# Patient Record
Sex: MALE | Race: OTHER | HISPANIC OR LATINO | URBAN - METROPOLITAN AREA
[De-identification: names, ages, dates, MRNs, and addresses within clinical notes are randomized per-mention and may not be internally consistent; named-entity substitution may affect disease eponyms.]

---

## 2023-05-16 ENCOUNTER — INPATIENT (INPATIENT)
Facility: HOSPITAL | Age: 53
LOS: 3 days | Discharge: HOME | DRG: 419 | End: 2023-05-20
Attending: SURGERY | Admitting: SURGERY
Payer: MEDICAID

## 2023-05-16 VITALS
RESPIRATION RATE: 19 BRPM | DIASTOLIC BLOOD PRESSURE: 69 MMHG | WEIGHT: 229.94 LBS | OXYGEN SATURATION: 97 % | SYSTOLIC BLOOD PRESSURE: 109 MMHG | TEMPERATURE: 98 F | HEART RATE: 100 BPM | HEIGHT: 75 IN

## 2023-05-16 DIAGNOSIS — K80.20 CALCULUS OF GALLBLADDER WITHOUT CHOLECYSTITIS WITHOUT OBSTRUCTION: ICD-10-CM

## 2023-05-16 LAB
ALBUMIN SERPL ELPH-MCNC: 3.5 G/DL — SIGNIFICANT CHANGE UP (ref 3.5–5)
ALP SERPL-CCNC: 78 U/L — SIGNIFICANT CHANGE UP (ref 40–120)
ALT FLD-CCNC: 34 U/L DA — SIGNIFICANT CHANGE UP (ref 10–60)
ANION GAP SERPL CALC-SCNC: 3 MMOL/L — LOW (ref 5–17)
ANION GAP SERPL CALC-SCNC: 3 MMOL/L — LOW (ref 5–17)
AST SERPL-CCNC: 19 U/L — SIGNIFICANT CHANGE UP (ref 10–40)
BASOPHILS # BLD AUTO: 0.05 K/UL — SIGNIFICANT CHANGE UP (ref 0–0.2)
BASOPHILS NFR BLD AUTO: 0.8 % — SIGNIFICANT CHANGE UP (ref 0–2)
BILIRUB SERPL-MCNC: 0.3 MG/DL — SIGNIFICANT CHANGE UP (ref 0.2–1.2)
BLD GP AB SCN SERPL QL: SIGNIFICANT CHANGE UP
BUN SERPL-MCNC: 13 MG/DL — SIGNIFICANT CHANGE UP (ref 7–18)
BUN SERPL-MCNC: 14 MG/DL — SIGNIFICANT CHANGE UP (ref 7–18)
CALCIUM SERPL-MCNC: 8.9 MG/DL — SIGNIFICANT CHANGE UP (ref 8.4–10.5)
CALCIUM SERPL-MCNC: 9.9 MG/DL — SIGNIFICANT CHANGE UP (ref 8.4–10.5)
CHLORIDE SERPL-SCNC: 111 MMOL/L — HIGH (ref 96–108)
CHLORIDE SERPL-SCNC: 111 MMOL/L — HIGH (ref 96–108)
CO2 SERPL-SCNC: 24 MMOL/L — SIGNIFICANT CHANGE UP (ref 22–31)
CO2 SERPL-SCNC: 25 MMOL/L — SIGNIFICANT CHANGE UP (ref 22–31)
CREAT SERPL-MCNC: 1.03 MG/DL — SIGNIFICANT CHANGE UP (ref 0.5–1.3)
CREAT SERPL-MCNC: 1.11 MG/DL — SIGNIFICANT CHANGE UP (ref 0.5–1.3)
EGFR: 80 ML/MIN/1.73M2 — SIGNIFICANT CHANGE UP
EGFR: 87 ML/MIN/1.73M2 — SIGNIFICANT CHANGE UP
EOSINOPHIL # BLD AUTO: 0.11 K/UL — SIGNIFICANT CHANGE UP (ref 0–0.5)
EOSINOPHIL NFR BLD AUTO: 1.7 % — SIGNIFICANT CHANGE UP (ref 0–6)
GLUCOSE SERPL-MCNC: 124 MG/DL — HIGH (ref 70–99)
GLUCOSE SERPL-MCNC: 175 MG/DL — HIGH (ref 70–99)
GLUCOSE SERPL-MCNC: 180 MG/DL — HIGH (ref 70–99)
HCT VFR BLD CALC: 40 % — SIGNIFICANT CHANGE UP (ref 39–50)
HGB BLD-MCNC: 14.1 G/DL — SIGNIFICANT CHANGE UP (ref 13–17)
IMM GRANULOCYTES NFR BLD AUTO: 0.3 % — SIGNIFICANT CHANGE UP (ref 0–0.9)
LIDOCAIN IGE QN: 237 U/L — SIGNIFICANT CHANGE UP (ref 73–393)
LYMPHOCYTES # BLD AUTO: 2.02 K/UL — SIGNIFICANT CHANGE UP (ref 1–3.3)
LYMPHOCYTES # BLD AUTO: 32 % — SIGNIFICANT CHANGE UP (ref 13–44)
MAGNESIUM SERPL-MCNC: 2.3 MG/DL — SIGNIFICANT CHANGE UP (ref 1.6–2.6)
MCHC RBC-ENTMCNC: 30.4 PG — SIGNIFICANT CHANGE UP (ref 27–34)
MCHC RBC-ENTMCNC: 35.3 GM/DL — SIGNIFICANT CHANGE UP (ref 32–36)
MCV RBC AUTO: 86.2 FL — SIGNIFICANT CHANGE UP (ref 80–100)
MONOCYTES # BLD AUTO: 0.53 K/UL — SIGNIFICANT CHANGE UP (ref 0–0.9)
MONOCYTES NFR BLD AUTO: 8.4 % — SIGNIFICANT CHANGE UP (ref 2–14)
NEUTROPHILS # BLD AUTO: 3.59 K/UL — SIGNIFICANT CHANGE UP (ref 1.8–7.4)
NEUTROPHILS NFR BLD AUTO: 56.8 % — SIGNIFICANT CHANGE UP (ref 43–77)
NRBC # BLD: 0 /100 WBCS — SIGNIFICANT CHANGE UP (ref 0–0)
PHOSPHATE SERPL-MCNC: 3.4 MG/DL — SIGNIFICANT CHANGE UP (ref 2.5–4.5)
PLATELET # BLD AUTO: 264 K/UL — SIGNIFICANT CHANGE UP (ref 150–400)
POTASSIUM SERPL-MCNC: 3.9 MMOL/L — SIGNIFICANT CHANGE UP (ref 3.5–5.3)
POTASSIUM SERPL-MCNC: 3.9 MMOL/L — SIGNIFICANT CHANGE UP (ref 3.5–5.3)
POTASSIUM SERPL-SCNC: 3.9 MMOL/L — SIGNIFICANT CHANGE UP (ref 3.5–5.3)
POTASSIUM SERPL-SCNC: 3.9 MMOL/L — SIGNIFICANT CHANGE UP (ref 3.5–5.3)
PROT SERPL-MCNC: 7.8 G/DL — SIGNIFICANT CHANGE UP (ref 6–8.3)
RBC # BLD: 4.64 M/UL — SIGNIFICANT CHANGE UP (ref 4.2–5.8)
RBC # FLD: 12 % — SIGNIFICANT CHANGE UP (ref 10.3–14.5)
SODIUM SERPL-SCNC: 138 MMOL/L — SIGNIFICANT CHANGE UP (ref 135–145)
SODIUM SERPL-SCNC: 139 MMOL/L — SIGNIFICANT CHANGE UP (ref 135–145)
TROPONIN I, HIGH SENSITIVITY RESULT: 6.4 NG/L — SIGNIFICANT CHANGE UP
WBC # BLD: 6.32 K/UL — SIGNIFICANT CHANGE UP (ref 3.8–10.5)
WBC # FLD AUTO: 6.32 K/UL — SIGNIFICANT CHANGE UP (ref 3.8–10.5)

## 2023-05-16 PROCEDURE — 99285 EMERGENCY DEPT VISIT HI MDM: CPT

## 2023-05-16 PROCEDURE — 74177 CT ABD & PELVIS W/CONTRAST: CPT | Mod: 26,MA

## 2023-05-16 PROCEDURE — 99222 1ST HOSP IP/OBS MODERATE 55: CPT

## 2023-05-16 PROCEDURE — 76705 ECHO EXAM OF ABDOMEN: CPT | Mod: 26

## 2023-05-16 RX ORDER — SODIUM CHLORIDE 9 MG/ML
1000 INJECTION, SOLUTION INTRAVENOUS
Refills: 0 | Status: DISCONTINUED | OUTPATIENT
Start: 2023-05-16 | End: 2023-05-20

## 2023-05-16 RX ORDER — DEXTROSE 50 % IN WATER 50 %
25 SYRINGE (ML) INTRAVENOUS ONCE
Refills: 0 | Status: DISCONTINUED | OUTPATIENT
Start: 2023-05-16 | End: 2023-05-20

## 2023-05-16 RX ORDER — ONDANSETRON 8 MG/1
4 TABLET, FILM COATED ORAL EVERY 6 HOURS
Refills: 0 | Status: DISCONTINUED | OUTPATIENT
Start: 2023-05-16 | End: 2023-05-20

## 2023-05-16 RX ORDER — ENOXAPARIN SODIUM 100 MG/ML
40 INJECTION SUBCUTANEOUS EVERY 24 HOURS
Refills: 0 | Status: DISCONTINUED | OUTPATIENT
Start: 2023-05-16 | End: 2023-05-19

## 2023-05-16 RX ORDER — METRONIDAZOLE 500 MG
500 TABLET ORAL ONCE
Refills: 0 | Status: COMPLETED | OUTPATIENT
Start: 2023-05-16 | End: 2023-05-16

## 2023-05-16 RX ORDER — GLUCAGON INJECTION, SOLUTION 0.5 MG/.1ML
1 INJECTION, SOLUTION SUBCUTANEOUS ONCE
Refills: 0 | Status: DISCONTINUED | OUTPATIENT
Start: 2023-05-16 | End: 2023-05-20

## 2023-05-16 RX ORDER — DEXTROSE 50 % IN WATER 50 %
12.5 SYRINGE (ML) INTRAVENOUS ONCE
Refills: 0 | Status: DISCONTINUED | OUTPATIENT
Start: 2023-05-16 | End: 2023-05-20

## 2023-05-16 RX ORDER — METRONIDAZOLE 500 MG
TABLET ORAL
Refills: 0 | Status: DISCONTINUED | OUTPATIENT
Start: 2023-05-16 | End: 2023-05-19

## 2023-05-16 RX ORDER — HYDROMORPHONE HYDROCHLORIDE 2 MG/ML
0.5 INJECTION INTRAMUSCULAR; INTRAVENOUS; SUBCUTANEOUS EVERY 4 HOURS
Refills: 0 | Status: DISCONTINUED | OUTPATIENT
Start: 2023-05-16 | End: 2023-05-20

## 2023-05-16 RX ORDER — SODIUM CHLORIDE 9 MG/ML
1000 INJECTION INTRAMUSCULAR; INTRAVENOUS; SUBCUTANEOUS ONCE
Refills: 0 | Status: COMPLETED | OUTPATIENT
Start: 2023-05-16 | End: 2023-05-16

## 2023-05-16 RX ORDER — MORPHINE SULFATE 50 MG/1
4 CAPSULE, EXTENDED RELEASE ORAL ONCE
Refills: 0 | Status: DISCONTINUED | OUTPATIENT
Start: 2023-05-16 | End: 2023-05-16

## 2023-05-16 RX ORDER — DEXTROSE 50 % IN WATER 50 %
15 SYRINGE (ML) INTRAVENOUS ONCE
Refills: 0 | Status: DISCONTINUED | OUTPATIENT
Start: 2023-05-16 | End: 2023-05-20

## 2023-05-16 RX ORDER — INSULIN LISPRO 100/ML
VIAL (ML) SUBCUTANEOUS EVERY 6 HOURS
Refills: 0 | Status: DISCONTINUED | OUTPATIENT
Start: 2023-05-16 | End: 2023-05-20

## 2023-05-16 RX ORDER — METRONIDAZOLE 500 MG
500 TABLET ORAL EVERY 8 HOURS
Refills: 0 | Status: DISCONTINUED | OUTPATIENT
Start: 2023-05-17 | End: 2023-05-19

## 2023-05-16 RX ADMIN — HYDROMORPHONE HYDROCHLORIDE 0.5 MILLIGRAM(S): 2 INJECTION INTRAMUSCULAR; INTRAVENOUS; SUBCUTANEOUS at 23:10

## 2023-05-16 RX ADMIN — Medication 100 MILLIGRAM(S): at 23:36

## 2023-05-16 RX ADMIN — SODIUM CHLORIDE 1000 MILLILITER(S): 9 INJECTION INTRAMUSCULAR; INTRAVENOUS; SUBCUTANEOUS at 17:00

## 2023-05-16 RX ADMIN — MORPHINE SULFATE 4 MILLIGRAM(S): 50 CAPSULE, EXTENDED RELEASE ORAL at 19:57

## 2023-05-16 RX ADMIN — MORPHINE SULFATE 4 MILLIGRAM(S): 50 CAPSULE, EXTENDED RELEASE ORAL at 17:45

## 2023-05-16 RX ADMIN — MORPHINE SULFATE 4 MILLIGRAM(S): 50 CAPSULE, EXTENDED RELEASE ORAL at 17:12

## 2023-05-16 RX ADMIN — SODIUM CHLORIDE 140 MILLILITER(S): 9 INJECTION, SOLUTION INTRAVENOUS at 23:36

## 2023-05-16 RX ADMIN — MORPHINE SULFATE 4 MILLIGRAM(S): 50 CAPSULE, EXTENDED RELEASE ORAL at 20:17

## 2023-05-16 RX ADMIN — ONDANSETRON 4 MILLIGRAM(S): 8 TABLET, FILM COATED ORAL at 22:21

## 2023-05-16 RX ADMIN — HYDROMORPHONE HYDROCHLORIDE 0.5 MILLIGRAM(S): 2 INJECTION INTRAMUSCULAR; INTRAVENOUS; SUBCUTANEOUS at 22:13

## 2023-05-16 RX ADMIN — ENOXAPARIN SODIUM 40 MILLIGRAM(S): 100 INJECTION SUBCUTANEOUS at 23:43

## 2023-05-16 RX ADMIN — Medication 30 MILLILITER(S): at 23:11

## 2023-05-16 NOTE — H&P ADULT - NSHPPHYSICALEXAM_GEN_ALL_CORE
Vital Signs Last 24 Hrs  T(C): 36.7 (16 May 2023 16:20), Max: 36.7 (16 May 2023 16:20)  T(F): 98 (16 May 2023 16:20), Max: 98 (16 May 2023 16:20)  HR: 100 (16 May 2023 16:20) (100 - 100)  BP: 109/69 (16 May 2023 16:20) (109/69 - 109/69)  BP(mean): --  RR: 19 (16 May 2023 16:20) (19 - 19)  SpO2: 97% (16 May 2023 16:20) (97% - 97%)    Parameters below as of 16 May 2023 16:20  Patient On (Oxygen Delivery Method): room air        General:  A&Ox3,Appears stated age, No acute distress,  Head: NC/AT  EENT: PERRLA. EOMI. Conjunctiva and sclera clear. Pharynx clear.  Neck: Supple. No JVD  Lungs: CTA B/l. Nonlabored Respirations  CV: +S1S2, RRR  Abdomen: Soft, Nondistended, ++RUQ tenderness, no guarding, no rebound  Extremities: Warm and well perfused. 2+ peripheral pulses b/l. Calf soft, nontender b/l. No pedal edema.

## 2023-05-16 NOTE — ED ADULT NURSE NOTE - OBJECTIVE STATEMENT
pt is here for abdominal pain.  pt stated that RUQ pain x one week, denied N/V/D or fever, a/ox3, ambulatory,

## 2023-05-16 NOTE — ED PROVIDER NOTE - CLINICAL SUMMARY MEDICAL DECISION MAKING FREE TEXT BOX
52-year-old male hx of HTN, DM, presenting with RUQ abdominal pain for the past week, worse today. Differential includes cholecystitis vs gastritis vs pancreatitis vs colitis vs other intrabdominal pathology - will check labs, urine, CTAP, RUQ US, provide analgesia and reassess.

## 2023-05-16 NOTE — ED ADULT TRIAGE NOTE - CCCP TRG CHIEF CMPLNT
abdominal pain Pounds Preamble Statement (Weight Entered In Details Tab): Reported Weight in pounds: 115

## 2023-05-16 NOTE — H&P ADULT - HISTORY OF PRESENT ILLNESS
51 y/o male with PMH of HTN, DM, Gastritis, Depression disorder presents with Abd Pain  x 1 wk . Pain is in the RUQ , very sharp, persistent  and disabling.  Not worse with food, no clear exacerbating factors. No nausea, vomiting, fevers, chills. No hx of abdominal surgeries. No other symptoms.

## 2023-05-16 NOTE — H&P ADULT - NSHPLABSRESULTS_GEN_ALL_CORE
14.1   6.32  )-----------( 264      ( 16 May 2023 17:00 )             40.0   05-16    138  |  111<H>  |  x   ----------------------------<  x   3.9   |  x   |  x     Ca    9.9      16 May 2023 17:00    TPro  7.8  /  Alb  3.5  /  TBili  0.3  /  DBili  x   /  AST  19  /  ALT  34  /  AlkPhos  78  05-16      < from: US Abdomen Upper Quadrant Right (05.16.23 @ 17:58) >    FINDINGS:  Liver: Hepatomegaly. Increased liver echogenicity suggestive of fatty   infiltration  Bile ducts: Normal caliber. Common bile duct measures 6 mm.  Gallbladder: Cholelithiasis with positive sonographic Blake sign. No   evidence of wall thickening or pericholecystic fluid.  Pancreas: Visualized portions are within normal limits.  Right kidney: 13.1 cm. No hydronephrosis.  Ascites: None.  IVC: Visualized portions are within normal limits.    IMPRESSION:  Fatty enlarged liver. Cholelithiasis without evidence of acute   cholecystitis    < end of copied text >

## 2023-05-16 NOTE — ED PROVIDER NOTE - OBJECTIVE STATEMENT
52-year-old male hx of HTN, DM, presenting with RUQ abdominal pain for the past week, worse today. Constant. Not worse with food, no clear exacerbating factors. No nausea, vomiting, fevers, chills. No hx of abdominal surgeries. No other symptoms.

## 2023-05-16 NOTE — H&P ADULT - ASSESSMENT
51 y/o male with PMH of HTN, DM, Gastritis, Depression disorder presents with Abd Pain  x 1 wk . Pain is in the RUQ , very sharp, persistent  and disabling.  Not worse with food, no clear exacerbating factors. Afebrile, No leucocytosis , No LFTs US shows cholelithiasis. Admitted for symptomatic cholelithiasis    Admit to Surgery under Dr Naik   OR-this admission for lap possible open cholecystectomy  NPO  IVF  IV ABx  f/up preop labs   DVT PPx

## 2023-05-17 DIAGNOSIS — I10 ESSENTIAL (PRIMARY) HYPERTENSION: ICD-10-CM

## 2023-05-17 DIAGNOSIS — Z01.818 ENCOUNTER FOR OTHER PREPROCEDURAL EXAMINATION: ICD-10-CM

## 2023-05-17 DIAGNOSIS — I25.10 ATHEROSCLEROTIC HEART DISEASE OF NATIVE CORONARY ARTERY WITHOUT ANGINA PECTORIS: ICD-10-CM

## 2023-05-17 LAB
A1C WITH ESTIMATED AVERAGE GLUCOSE RESULT: 10.8 % — HIGH (ref 4–5.6)
ALBUMIN SERPL ELPH-MCNC: 2.8 G/DL — LOW (ref 3.5–5)
ALP SERPL-CCNC: 53 U/L — SIGNIFICANT CHANGE UP (ref 40–120)
ALT FLD-CCNC: 29 U/L DA — SIGNIFICANT CHANGE UP (ref 10–60)
ANION GAP SERPL CALC-SCNC: 5 MMOL/L — SIGNIFICANT CHANGE UP (ref 5–17)
APTT BLD: 21.1 SEC — LOW (ref 27.5–35.5)
APTT BLD: 33.1 SEC — SIGNIFICANT CHANGE UP (ref 27.5–35.5)
AST SERPL-CCNC: 15 U/L — SIGNIFICANT CHANGE UP (ref 10–40)
BASOPHILS # BLD AUTO: 0.04 K/UL — SIGNIFICANT CHANGE UP (ref 0–0.2)
BASOPHILS NFR BLD AUTO: 0.6 % — SIGNIFICANT CHANGE UP (ref 0–2)
BILIRUB SERPL-MCNC: 0.4 MG/DL — SIGNIFICANT CHANGE UP (ref 0.2–1.2)
BUN SERPL-MCNC: 11 MG/DL — SIGNIFICANT CHANGE UP (ref 7–18)
CALCIUM SERPL-MCNC: 8.4 MG/DL — SIGNIFICANT CHANGE UP (ref 8.4–10.5)
CHLORIDE SERPL-SCNC: 106 MMOL/L — SIGNIFICANT CHANGE UP (ref 96–108)
CO2 SERPL-SCNC: 26 MMOL/L — SIGNIFICANT CHANGE UP (ref 22–31)
CREAT SERPL-MCNC: 1.06 MG/DL — SIGNIFICANT CHANGE UP (ref 0.5–1.3)
EGFR: 84 ML/MIN/1.73M2 — SIGNIFICANT CHANGE UP
EOSINOPHIL # BLD AUTO: 0.05 K/UL — SIGNIFICANT CHANGE UP (ref 0–0.5)
EOSINOPHIL NFR BLD AUTO: 0.7 % — SIGNIFICANT CHANGE UP (ref 0–6)
ESTIMATED AVERAGE GLUCOSE: 263 MG/DL — HIGH (ref 68–114)
GLUCOSE BLDC GLUCOMTR-MCNC: 167 MG/DL — HIGH (ref 70–99)
GLUCOSE BLDC GLUCOMTR-MCNC: 188 MG/DL — HIGH (ref 70–99)
GLUCOSE BLDC GLUCOMTR-MCNC: 203 MG/DL — HIGH (ref 70–99)
GLUCOSE BLDC GLUCOMTR-MCNC: 235 MG/DL — HIGH (ref 70–99)
GLUCOSE BLDC GLUCOMTR-MCNC: 241 MG/DL — HIGH (ref 70–99)
GLUCOSE BLDC GLUCOMTR-MCNC: 261 MG/DL — HIGH (ref 70–99)
GLUCOSE BLDC GLUCOMTR-MCNC: 267 MG/DL — HIGH (ref 70–99)
GLUCOSE BLDC GLUCOMTR-MCNC: 272 MG/DL — HIGH (ref 70–99)
GLUCOSE SERPL-MCNC: 286 MG/DL — HIGH (ref 70–99)
HCT VFR BLD CALC: 36 % — LOW (ref 39–50)
HGB BLD-MCNC: 12.4 G/DL — LOW (ref 13–17)
IMM GRANULOCYTES NFR BLD AUTO: 0.4 % — SIGNIFICANT CHANGE UP (ref 0–0.9)
INR BLD: 1.07 RATIO — SIGNIFICANT CHANGE UP (ref 0.88–1.16)
INR BLD: 2.05 RATIO — HIGH (ref 0.88–1.16)
LYMPHOCYTES # BLD AUTO: 1.74 K/UL — SIGNIFICANT CHANGE UP (ref 1–3.3)
LYMPHOCYTES # BLD AUTO: 24.6 % — SIGNIFICANT CHANGE UP (ref 13–44)
MCHC RBC-ENTMCNC: 30.2 PG — SIGNIFICANT CHANGE UP (ref 27–34)
MCHC RBC-ENTMCNC: 34.4 GM/DL — SIGNIFICANT CHANGE UP (ref 32–36)
MCV RBC AUTO: 87.8 FL — SIGNIFICANT CHANGE UP (ref 80–100)
MONOCYTES # BLD AUTO: 0.5 K/UL — SIGNIFICANT CHANGE UP (ref 0–0.9)
MONOCYTES NFR BLD AUTO: 7.1 % — SIGNIFICANT CHANGE UP (ref 2–14)
NEUTROPHILS # BLD AUTO: 4.72 K/UL — SIGNIFICANT CHANGE UP (ref 1.8–7.4)
NEUTROPHILS NFR BLD AUTO: 66.6 % — SIGNIFICANT CHANGE UP (ref 43–77)
NRBC # BLD: 0 /100 WBCS — SIGNIFICANT CHANGE UP (ref 0–0)
PLATELET # BLD AUTO: 213 K/UL — SIGNIFICANT CHANGE UP (ref 150–400)
POTASSIUM SERPL-MCNC: 4 MMOL/L — SIGNIFICANT CHANGE UP (ref 3.5–5.3)
POTASSIUM SERPL-SCNC: 4 MMOL/L — SIGNIFICANT CHANGE UP (ref 3.5–5.3)
PROT SERPL-MCNC: 6.6 G/DL — SIGNIFICANT CHANGE UP (ref 6–8.3)
PROTHROM AB SERPL-ACNC: 12.8 SEC — SIGNIFICANT CHANGE UP (ref 10.5–13.4)
PROTHROM AB SERPL-ACNC: 24.6 SEC — HIGH (ref 10.5–13.4)
RBC # BLD: 4.1 M/UL — LOW (ref 4.2–5.8)
RBC # FLD: 12.2 % — SIGNIFICANT CHANGE UP (ref 10.3–14.5)
SODIUM SERPL-SCNC: 137 MMOL/L — SIGNIFICANT CHANGE UP (ref 135–145)
WBC # BLD: 7.08 K/UL — SIGNIFICANT CHANGE UP (ref 3.8–10.5)
WBC # FLD AUTO: 7.08 K/UL — SIGNIFICANT CHANGE UP (ref 3.8–10.5)

## 2023-05-17 PROCEDURE — 99232 SBSQ HOSP IP/OBS MODERATE 35: CPT

## 2023-05-17 PROCEDURE — 99222 1ST HOSP IP/OBS MODERATE 55: CPT

## 2023-05-17 PROCEDURE — 99223 1ST HOSP IP/OBS HIGH 75: CPT

## 2023-05-17 RX ORDER — DULOXETINE HYDROCHLORIDE 30 MG/1
60 CAPSULE, DELAYED RELEASE ORAL DAILY
Refills: 0 | Status: DISCONTINUED | OUTPATIENT
Start: 2023-05-17 | End: 2023-05-20

## 2023-05-17 RX ORDER — LISINOPRIL 2.5 MG/1
2.5 TABLET ORAL DAILY
Refills: 0 | Status: DISCONTINUED | OUTPATIENT
Start: 2023-05-17 | End: 2023-05-20

## 2023-05-17 RX ORDER — INSULIN GLARGINE 100 [IU]/ML
15 INJECTION, SOLUTION SUBCUTANEOUS AT BEDTIME
Refills: 0 | Status: DISCONTINUED | OUTPATIENT
Start: 2023-05-17 | End: 2023-05-20

## 2023-05-17 RX ORDER — PHYTONADIONE (VIT K1) 5 MG
10 TABLET ORAL ONCE
Refills: 0 | Status: COMPLETED | OUTPATIENT
Start: 2023-05-17 | End: 2023-05-17

## 2023-05-17 RX ORDER — LISINOPRIL 2.5 MG/1
1 TABLET ORAL
Refills: 0 | DISCHARGE

## 2023-05-17 RX ORDER — DULOXETINE HYDROCHLORIDE 30 MG/1
1 CAPSULE, DELAYED RELEASE ORAL
Refills: 0 | DISCHARGE

## 2023-05-17 RX ORDER — ACETAMINOPHEN 500 MG
1000 TABLET ORAL ONCE
Refills: 0 | Status: COMPLETED | OUTPATIENT
Start: 2023-05-17 | End: 2023-05-17

## 2023-05-17 RX ORDER — FAMOTIDINE 10 MG/ML
1 INJECTION INTRAVENOUS
Refills: 0 | DISCHARGE

## 2023-05-17 RX ORDER — ASPIRIN/CALCIUM CARB/MAGNESIUM 324 MG
1 TABLET ORAL
Refills: 0 | DISCHARGE

## 2023-05-17 RX ORDER — ASPIRIN/CALCIUM CARB/MAGNESIUM 324 MG
81 TABLET ORAL DAILY
Refills: 0 | Status: DISCONTINUED | OUTPATIENT
Start: 2023-05-17 | End: 2023-05-20

## 2023-05-17 RX ORDER — INSULIN GLARGINE 100 [IU]/ML
35 INJECTION, SOLUTION SUBCUTANEOUS
Refills: 0 | DISCHARGE

## 2023-05-17 RX ORDER — INSULIN ASPART 100 [IU]/ML
25 INJECTION, SOLUTION SUBCUTANEOUS
Refills: 0 | DISCHARGE

## 2023-05-17 RX ORDER — FAMOTIDINE 10 MG/ML
40 INJECTION INTRAVENOUS DAILY
Refills: 0 | Status: DISCONTINUED | OUTPATIENT
Start: 2023-05-17 | End: 2023-05-20

## 2023-05-17 RX ORDER — KETOROLAC TROMETHAMINE 30 MG/ML
15 SYRINGE (ML) INJECTION EVERY 6 HOURS
Refills: 0 | Status: DISCONTINUED | OUTPATIENT
Start: 2023-05-17 | End: 2023-05-20

## 2023-05-17 RX ADMIN — Medication 100 MILLIGRAM(S): at 21:44

## 2023-05-17 RX ADMIN — ENOXAPARIN SODIUM 40 MILLIGRAM(S): 100 INJECTION SUBCUTANEOUS at 22:56

## 2023-05-17 RX ADMIN — ONDANSETRON 4 MILLIGRAM(S): 8 TABLET, FILM COATED ORAL at 09:43

## 2023-05-17 RX ADMIN — Medication 6: at 12:40

## 2023-05-17 RX ADMIN — Medication 4: at 01:31

## 2023-05-17 RX ADMIN — Medication 100 MILLIGRAM(S): at 13:35

## 2023-05-17 RX ADMIN — Medication 400 MILLIGRAM(S): at 13:18

## 2023-05-17 RX ADMIN — Medication 100 MILLIGRAM(S): at 05:30

## 2023-05-17 RX ADMIN — SODIUM CHLORIDE 140 MILLILITER(S): 9 INJECTION, SOLUTION INTRAVENOUS at 22:56

## 2023-05-17 RX ADMIN — SODIUM CHLORIDE 140 MILLILITER(S): 9 INJECTION, SOLUTION INTRAVENOUS at 11:43

## 2023-05-17 RX ADMIN — Medication 15 MILLIGRAM(S): at 18:40

## 2023-05-17 RX ADMIN — INSULIN GLARGINE 15 UNIT(S): 100 INJECTION, SOLUTION SUBCUTANEOUS at 22:06

## 2023-05-17 RX ADMIN — ONDANSETRON 4 MILLIGRAM(S): 8 TABLET, FILM COATED ORAL at 18:12

## 2023-05-17 RX ADMIN — Medication 4: at 23:53

## 2023-05-17 RX ADMIN — Medication 1000 MILLIGRAM(S): at 00:00

## 2023-05-17 RX ADMIN — Medication 102 MILLIGRAM(S): at 09:06

## 2023-05-17 RX ADMIN — Medication 6: at 06:10

## 2023-05-17 RX ADMIN — Medication 2: at 18:13

## 2023-05-17 RX ADMIN — HYDROMORPHONE HYDROCHLORIDE 0.5 MILLIGRAM(S): 2 INJECTION INTRAMUSCULAR; INTRAVENOUS; SUBCUTANEOUS at 10:05

## 2023-05-17 RX ADMIN — HYDROMORPHONE HYDROCHLORIDE 0.5 MILLIGRAM(S): 2 INJECTION INTRAMUSCULAR; INTRAVENOUS; SUBCUTANEOUS at 09:38

## 2023-05-17 RX ADMIN — DULOXETINE HYDROCHLORIDE 60 MILLIGRAM(S): 30 CAPSULE, DELAYED RELEASE ORAL at 11:40

## 2023-05-17 RX ADMIN — Medication 15 MILLIGRAM(S): at 18:12

## 2023-05-17 RX ADMIN — Medication 81 MILLIGRAM(S): at 11:41

## 2023-05-17 NOTE — CONSULT NOTE ADULT - PROBLEM SELECTOR RECOMMENDATION 9
-patient currently with no evidence of acute MI, arrythmia, CHF, severe aortic stenosis  -RCRI class III risk which is 10% -30 day risk of death, MI or cardiac arrest  -obtain echo for structural abnormalities  -if echo unremarkable, no further testing needed prior to surgery

## 2023-05-17 NOTE — CONSULT NOTE ADULT - ASSESSMENT
53 y/o male with PMH of HTN, DM, Gastritis, Depression disorder, PAD, CAD on aspirin, Ruptured brain aneurysm with surgical decompression 10 years ago presents with abdominal pain x 1 week.  CT revealed gallbladder stones. Patient getting ready for lap cholecystectomy.  Upon eval, patient reports having SOB after he goes up 2 flights of stairs.   Patient denies chest pain, palpitations, syncope, LE edema, PND/orthopnea. Cardiology was consulted for pre op clearance

## 2023-05-17 NOTE — CONSULT NOTE ADULT - PROBLEM SELECTOR RECOMMENDATION 3
-Stable  -DASH/TLC when able to eat  -BP goal of < 140/90 (age < 60, DM, )   - c/w home meds w/ holding parameters  - monitor BP & titrate BP meds PRN

## 2023-05-17 NOTE — CONSULT NOTE ADULT - ASSESSMENT
51 y/o male with PMH of HTN, DM, Gastritis, Depression disorder, PAD, CAD on aspirin, Ruptured brain aneurysm with surgical decompression 10 years ago presents with Abd Pain  x 1 wk . Pain is in the RUQ , very sharp, persistent  and disabling.  Not worse with food, no clear exacerbating factors. CT abdomen showed gall stones , Patient was evaluated by surgery and was started on IV antibiotics , Medicine is consulted for optimization prior to lap cholecystectomy.     # medical optimization for lap calvin  - Patient's Revised Cardiac Risk Index (RCRI) score is  Class 3 risk . Patient is at 10.1 % risk of  adverse perioperative cardiac events undergoing moderate risk surgery.  - EKG NSR   - METS < 4     # DM   -Patient is on Novologue 25 units TID premeals and Basaglar 35 units at night   - Sliding scale while NPO , consider starting low dose of lantus if FS is elevated   - Monitor FS     # HTN   - patient is lisinopril 2.5 mg at home  - Resume home meds and monitor BP   - please do med rec    #CAD   - Resume aspirin     # gastritis   - Resume famotidine    # depression   -Resume Duloxetine     # DVT prophylaxis   - As per surgery   >>>>>>>>>>> note is incomplete>>>>>>>>   53 y/o male with PMH of HTN, DM, Gastritis, Diabetic neuropathy, PAD, CAD on aspirin, Ruptured brain aneurysm with surgical decompression 10 years ago presents with Abd Pain  x 1 wk . Pain is in the RUQ , very sharp, persistent  and disabling.  Not worse with food, no clear exacerbating factors. CT abdomen showed gall stones , Patient was evaluated by surgery and was started on IV antibiotics , Medicine is consulted for optimization prior to lap cholecystectomy.     # medical optimization for lap calvin  - Patient's Revised Cardiac Risk Index (RCRI) score is  Class 3 risk . Patient is at 10.1 % risk ( moderate risk )of  adverse perioperative cardiac events undergoing moderate risk surgery.  - EKG NSR   - METS < 4   - Given PMH of CAD, PAD , long standing DM , diabetic neuropathy, Recommend cardiology consult for medical optimization.   - ECHO     # DM   -Patient is on Novologue 25 units TID premeals and Basaglar 35 units at night   - Sliding scale while NPO , consider starting lantus 15 units if FS is elevated   - Monitor FS and Adjust as needed  - Recommend checking HBA1c    # HTN   - patient is lisinopril 2.5 mg at home  - Resume home meds and monitor BP     #CAD   - Resume aspirin     # gastritis   - Resume famotidine    # Diabetic neuropathy  -Resume Duloxetine     # DVT prophylaxis   - As per surgery      53 y/o male with PMH of HTN, DM, Gastritis, Diabetic neuropathy, PAD, CAD on aspirin, Ruptured brain aneurysm with surgical decompression 10 years ago presents with Abd Pain  x 1 wk . Pain is in the RUQ , very sharp, persistent  and disabling.  Not worse with food, no clear exacerbating factors. CT abdomen showed gall stones , Patient was evaluated by surgery and was started on IV antibiotics , Medicine is consulted for optimization prior to lap cholecystectomy.     # medical optimization for lap calvin  - Patient's Revised Cardiac Risk Index (RCRI) score is  Class 3 risk . Patient is at 10.1 % risk ( moderate risk )of  adverse perioperative cardiac events undergoing moderate risk surgery.  - EKG NSR   - METS < 4   - Given PMH of CAD, PAD , long standing DM , diabetic neuropathy, Recommend cardiology consult for medical optimization.   - ECHO     # Migraine   - Recommend migraine cocktail if symptoms persists ( Benadryl 25 mg IV , metoclopramide 10 mg IV , valproate  mg ) .     # DM   -Patient is on Novologue 25 units TID premeals and Basaglar 35 units at night   - Sliding scale while NPO , consider starting lantus 15 units if FS is elevated   - Monitor FS and Adjust as needed  - Recommend checking HBA1c  - Recommend nutrition consult    # HTN   - patient is lisinopril 2.5 mg at home  - Resume home meds and monitor BP     #CAD   - Resume aspirin     # gastritis   - Resume famotidine    # Diabetic neuropathy  -Resume Duloxetine     # DVT prophylaxis   - As per surgery

## 2023-05-17 NOTE — PROGRESS NOTE ADULT - SUBJECTIVE AND OBJECTIVE BOX
INTERVAL HPI/OVERNIGHT EVENTS:  No acute events overnight. Pt resting comfortably. Reports pain has improved but is still there, and reports nausea and headache now.   Also endorses a history of an aneurysm in the brain where he had to have a anny hole to release the pressure several years ago.     MEDICATIONS  (STANDING):  dextrose 5% + sodium chloride 0.45%. 1000 milliLiter(s) (140 mL/Hr) IV Continuous <Continuous>  dextrose 5%. 1000 milliLiter(s) (50 mL/Hr) IV Continuous <Continuous>  dextrose 50% Injectable 25 Gram(s) IV Push once  dextrose 50% Injectable 12.5 Gram(s) IV Push once  enoxaparin Injectable 40 milliGRAM(s) SubCutaneous every 24 hours  glucagon  Injectable 1 milliGRAM(s) IntraMuscular once  insulin lispro (ADMELOG) corrective regimen sliding scale   SubCutaneous every 6 hours  levoFLOXacin IVPB      levoFLOXacin IVPB 750 milliGRAM(s) IV Intermittent every 24 hours  metroNIDAZOLE  IVPB      metroNIDAZOLE  IVPB 500 milliGRAM(s) IV Intermittent every 8 hours  phytonadione  IVPB 10 milliGRAM(s) IV Intermittent once    MEDICATIONS  (PRN):  aluminum hydroxide/magnesium hydroxide/simethicone Suspension 30 milliLiter(s) Oral every 4 hours PRN Dyspepsia  dextrose Oral Gel 15 Gram(s) Oral once PRN Blood Glucose LESS THAN 70 milliGRAM(s)/deciliter  HYDROmorphone  Injectable 0.5 milliGRAM(s) IV Push every 4 hours PRN Moderate Pain (4 - 6)  ondansetron Injectable 4 milliGRAM(s) IV Push every 6 hours PRN Nausea      Vital Signs Last 24 Hrs  T(C): 36.6 (17 May 2023 07:30), Max: 36.7 (16 May 2023 16:20)  T(F): 97.8 (17 May 2023 07:30), Max: 98 (16 May 2023 16:20)  HR: 94 (17 May 2023 07:30) (88 - 100)  BP: 128/74 (17 May 2023 07:30) (109/69 - 145/85)  BP(mean): 105 (17 May 2023 04:55) (93 - 105)  RR: 17 (17 May 2023 07:30) (17 - 19)  SpO2: 98% (17 May 2023 07:30) (97% - 100%)    Parameters below as of 17 May 2023 07:30  Patient On (Oxygen Delivery Method): room air    Physical:  General: Alert and oriented, not in acute distress  Resp: Breathing unlabored  Abdomen: Soft, nondistended, tender in right upper quadrant; remainder of abd nontender  : No willard catheter, no dysuria or hematuria  Extremities: No pedal edema      LABS:                        12.4   7.08  )-----------( 213      ( 17 May 2023 05:40 )             36.0             05-17    137  |  106  |  11  ----------------------------<  286<H>  4.0   |  26  |  1.06    Ca    8.4      17 May 2023 05:40  Phos  3.4     05-16  Mg     2.3     05-16    TPro  6.6  /  Alb  2.8<L>  /  TBili  0.4  /  DBili  x   /  AST  15  /  ALT  29  /  AlkPhos  53  05-17

## 2023-05-17 NOTE — CONSULT NOTE ADULT - NS ATTEND AMEND GEN_ALL_CORE FT
53 yo M with DM who is evaluated for preprocedural evaluation prior to cholecystectomy.  Patient is euvolemic on exam and able to ascend a flight of stairs without any symptoms, though he begins to get dyspneic by the time he gets to the second flight. EKG is unremarkable.   -Risk stratification as per above  -Review of echocardiogram showed preserved EF and no significant valvular pathology  -No need for further cardiac testing prior to patient's surgery  -If patient's exertional symptoms persist postoperatively, would recommend outpatient evaluation by cardiologist for consideration of further testing at that time  -He should also undergo outpatient blood work including lipid panel to calculate his ASCVD risk score, as patient will likely benefit from a statin.

## 2023-05-17 NOTE — CONSULT NOTE ADULT - PROBLEM SELECTOR RECOMMENDATION 2
-given history of DM, patient should be on statin  -may start post op on outpatient bases  -will need lipid profile, may be outpatient as well  -also, given reports of SOB on exertion, patient will need nuclear stress test which may be done outpatient as well -given history of DM, patient should be on statin  -may start post op on outpatient bases  -will need lipid profile, may be outpatient as well  -also, given reports of SOB on exertion, if does not improve after surgery patient may need additional outpatient testing as well

## 2023-05-17 NOTE — PATIENT PROFILE ADULT - FALL HARM RISK - HARM RISK INTERVENTIONS
Assistance with ambulation/Assistance OOB with selected safe patient handling equipment/Communicate Risk of Fall with Harm to all staff/Reinforce activity limits and safety measures with patient and family/Reorient to person, place and time as needed/Review medications for side effects contributing to fall risk/Sit up slowly, dangle for a short time, stand at bedside before walking/Tailored Fall Risk Interventions/Visual Cue: Yellow wristband and red socks/Bed in lowest position, wheels locked, appropriate side rails in place/Call bell, personal items and telephone in reach/Instruct patient to call for assistance before getting out of bed or chair/Non-slip footwear when patient is out of bed/[place] to call system/Physically safe environment - no spills, clutter or unnecessary equipment/Purposeful Proactive Rounding/Room/bathroom lighting operational, light cord in reach

## 2023-05-17 NOTE — PATIENT PROFILE ADULT - FUNCTIONAL ASSESSMENT - DAILY ACTIVITY SCORE.
She CKD stage 3 in setting of DDRT in 2007 at [facility]  baseline serum creatinine 1 3-1 7, follows with Dr Cece Gonzalez of [facility]  Plan:   -Cr  1 99 > 1 76  -monitor diuresis, attempt to transition to p o   Lasix today  -strict I/O's  -avoid IV studies as possible  -continue home immunosuppressants 24

## 2023-05-17 NOTE — CONSULT NOTE ADULT - SUBJECTIVE AND OBJECTIVE BOX
CHIEF COMPLAINT:    HPI:    PAST MEDICAL & SURGICAL HISTORY:  Mild HTN      DM (diabetes mellitus)      Gastritis      Major depression          Allergies    penicillin (Hives)    Intolerances        MEDICATIONS  (STANDING):  aspirin enteric coated 81 milliGRAM(s) Oral daily  dextrose 5% + sodium chloride 0.45%. 1000 milliLiter(s) (140 mL/Hr) IV Continuous <Continuous>  dextrose 5%. 1000 milliLiter(s) (50 mL/Hr) IV Continuous <Continuous>  dextrose 50% Injectable 25 Gram(s) IV Push once  dextrose 50% Injectable 12.5 Gram(s) IV Push once  DULoxetine 60 milliGRAM(s) Oral daily  enoxaparin Injectable 40 milliGRAM(s) SubCutaneous every 24 hours  famotidine    Tablet 40 milliGRAM(s) Oral daily  glucagon  Injectable 1 milliGRAM(s) IntraMuscular once  insulin glargine Injectable (LANTUS) 15 Unit(s) SubCutaneous at bedtime  insulin lispro (ADMELOG) corrective regimen sliding scale   SubCutaneous every 6 hours  levoFLOXacin IVPB      levoFLOXacin IVPB 750 milliGRAM(s) IV Intermittent every 24 hours  lisinopril 2.5 milliGRAM(s) Oral daily  metroNIDAZOLE  IVPB      metroNIDAZOLE  IVPB 500 milliGRAM(s) IV Intermittent every 8 hours    MEDICATIONS  (PRN):  aluminum hydroxide/magnesium hydroxide/simethicone Suspension 30 milliLiter(s) Oral every 4 hours PRN Dyspepsia  dextrose Oral Gel 15 Gram(s) Oral once PRN Blood Glucose LESS THAN 70 milliGRAM(s)/deciliter  HYDROmorphone  Injectable 0.5 milliGRAM(s) IV Push every 4 hours PRN Moderate Pain (4 - 6)  ketorolac   Injectable 15 milliGRAM(s) IV Push every 6 hours PRN Moderate Pain (4 - 6)  ondansetron Injectable 4 milliGRAM(s) IV Push every 6 hours PRN Nausea      FAMILY HISTORY:      ***No family history of premature coronary artery disease or sudden cardiac death    SOCIAL HISTORY:  Smoking-  Alcohol-  Illicit Drug use-    REVIEW OF SYSTEMS:  Constitutional: [ ] fever, [ ]weight loss,  [ ]fatigue  Eyes: [ ] visual changes  Respiratory: [ ]shortness of breath;  [ ] cough, [ ]wheezing, [ ]chills, [ ]hemoptysis  Cardiovascular: [ ] chest pain, [ ]palpitations, [ ]dizziness,  [ ]leg swelling [ ]syncope  Gastrointestinal: [ ] abdominal pain, [ ]nausea, [ ]vomiting,  [ ]diarrhea   Genitourinary: [ ] dysuria, [ ] hematuria  Neurologic: [ ] headaches [ ] tremors  [ ] weakness [ ] lightheadedness  Skin: [ ] itching, [ ]burning, [ ] rashes  Endocrine: [ ] heat or cold intolerance  Musculoskeletal: [ ] joint pain or swelling; [ ] muscle, back, or extremity pain  Psychiatric: [ ] depression, [ ]anxiety, [ ]mood swings, or [ ]difficulty sleeping  Hematologic: [ ] easy bruising, [ ] bleeding gums     [ x] All others negative	  [ ] Unable to obtain    Vital Signs Last 24 Hrs  T(C): 36.4 (17 May 2023 09:29), Max: 36.7 (16 May 2023 16:20)  T(F): 97.6 (17 May 2023 09:29), Max: 98 (16 May 2023 16:20)  HR: 91 (17 May 2023 09:29) (88 - 100)  BP: 150/89 (17 May 2023 09:29) (109/69 - 150/89)  BP(mean): 105 (17 May 2023 04:55) (93 - 105)  RR: 17 (17 May 2023 09:29) (17 - 19)  SpO2: 99% (17 May 2023 09:29) (97% - 100%)    Parameters below as of 17 May 2023 09:29  Patient On (Oxygen Delivery Method): room air      I&O's Summary      PHYSICAL EXAM:  General: No acute distress  HEENT: EOMI  Neck:  No JVD  Lungs: Clear to auscultation bilaterally; No rales or wheezing  Heart: Regular rate and rhythm; No murmurs, rubs, or gallops  Abdomen: soft, non tender, non distended   Extremities: warm, no edema   Nervous system:  Alert & Oriented X3  Psychiatric: Normal affect  Skin: No rashes or lesions    LABS:  05-17    137  |  106  |  11  ----------------------------<  286<H>  4.0   |  26  |  1.06    Ca    8.4      17 May 2023 05:40  Phos  3.4     05-16  Mg     2.3     05-16    TPro  6.6  /  Alb  2.8<L>  /  TBili  0.4  /  DBili  x   /  AST  15  /  ALT  29  /  AlkPhos  53  05-17    Creatinine Trend: 1.06<--, 1.03<--, 1.11<--                        12.4   7.08  )-----------( 213      ( 17 May 2023 05:40 )             36.0     PT/INR - ( 17 May 2023 07:28 )   PT: 12.8 sec;   INR: 1.07 ratio         PTT - ( 17 May 2023 07:28 )  PTT:33.1 sec    Lipid Panel:   Cardiac Enzymes:           RADIOLOGY:    ECG [my interpretation]:    TELEMETRY:    ECHO:    STRESS TEST:    CATHETERIZATION: CHIEF COMPLAINT: abdominal pain    HPI:  51 y/o male with PMH of HTN, DM, Gastritis, Depression disorder, PAD, CAD on aspirin, Ruptured brain aneurysm with surgical decompression 10 years ago presents with abdominal pain x 1 week.  CT revealed gallbladder stones. Patient getting ready for lap cholecystectomy.  Upon eval, patient reports having SOB after he goes up 2 flights of stairs.   Patient denies chest pain, palpitations, syncope, LE edema, PND/orthopnea.     PAST MEDICAL & SURGICAL HISTORY:  Mild HTN  DM (diabetes mellitus)  Gastritis  Major depression    Allergies    penicillin (Hives)    Intolerances        MEDICATIONS  (STANDING):  aspirin enteric coated 81 milliGRAM(s) Oral daily  dextrose 5% + sodium chloride 0.45%. 1000 milliLiter(s) (140 mL/Hr) IV Continuous <Continuous>  dextrose 5%. 1000 milliLiter(s) (50 mL/Hr) IV Continuous <Continuous>  dextrose 50% Injectable 25 Gram(s) IV Push once  dextrose 50% Injectable 12.5 Gram(s) IV Push once  DULoxetine 60 milliGRAM(s) Oral daily  enoxaparin Injectable 40 milliGRAM(s) SubCutaneous every 24 hours  famotidine    Tablet 40 milliGRAM(s) Oral daily  glucagon  Injectable 1 milliGRAM(s) IntraMuscular once  insulin glargine Injectable (LANTUS) 15 Unit(s) SubCutaneous at bedtime  insulin lispro (ADMELOG) corrective regimen sliding scale   SubCutaneous every 6 hours  levoFLOXacin IVPB      levoFLOXacin IVPB 750 milliGRAM(s) IV Intermittent every 24 hours  lisinopril 2.5 milliGRAM(s) Oral daily  metroNIDAZOLE  IVPB      metroNIDAZOLE  IVPB 500 milliGRAM(s) IV Intermittent every 8 hours    MEDICATIONS  (PRN):  aluminum hydroxide/magnesium hydroxide/simethicone Suspension 30 milliLiter(s) Oral every 4 hours PRN Dyspepsia  dextrose Oral Gel 15 Gram(s) Oral once PRN Blood Glucose LESS THAN 70 milliGRAM(s)/deciliter  HYDROmorphone  Injectable 0.5 milliGRAM(s) IV Push every 4 hours PRN Moderate Pain (4 - 6)  ketorolac   Injectable 15 milliGRAM(s) IV Push every 6 hours PRN Moderate Pain (4 - 6)  ondansetron Injectable 4 milliGRAM(s) IV Push every 6 hours PRN Nausea      FAMILY HISTORY:  Father- alzheimer's  Mother-Covid    ***No family history of premature coronary artery disease or sudden cardiac death    SOCIAL HISTORY:  Smoking-denies  Alcohol-socially  Illicit Drug use-denies    REVIEW OF SYSTEMS:  Constitutional: [ ] fever, [ ]weight loss,  [ ]fatigue  Eyes: [ ] visual changes  Respiratory: [x ]shortness of breath;  [ ] cough, [ ]wheezing, [ ]chills, [ ]hemoptysis  Cardiovascular: [ ] chest pain, [ ]palpitations, [ ]dizziness,  [ ]leg swelling [ ]syncope  Gastrointestinal: [ x] abdominal pain, [ ]nausea, [ ]vomiting,  [ ]diarrhea   Genitourinary: [ ] dysuria, [ ] hematuria  Neurologic: [ ] headaches [ ] tremors  [ ] weakness [ ] lightheadedness  Skin: [ ] itching, [ ]burning, [ ] rashes  Endocrine: [ ] heat or cold intolerance  Musculoskeletal: [ ] joint pain or swelling; [ ] muscle, back, or extremity pain  Psychiatric: [ ] depression, [ ]anxiety, [ ]mood swings, or [ ]difficulty sleeping  Hematologic: [ ] easy bruising, [ ] bleeding gums     [ x] All others negative	  [ ] Unable to obtain    Vital Signs Last 24 Hrs  T(C): 36.4 (17 May 2023 09:29), Max: 36.7 (16 May 2023 16:20)  T(F): 97.6 (17 May 2023 09:29), Max: 98 (16 May 2023 16:20)  HR: 91 (17 May 2023 09:29) (88 - 100)  BP: 150/89 (17 May 2023 09:29) (109/69 - 150/89)  BP(mean): 105 (17 May 2023 04:55) (93 - 105)  RR: 17 (17 May 2023 09:29) (17 - 19)  SpO2: 99% (17 May 2023 09:29) (97% - 100%)    Parameters below as of 17 May 2023 09:29  Patient On (Oxygen Delivery Method): room air      I&O's Summary      PHYSICAL EXAM:  General: No acute distress  HEENT: EOMI  Neck:  No JVD  Lungs: Clear to auscultation bilaterally; No rales or wheezing  Heart: Regular rate and rhythm; No murmurs, rubs, or gallops  Abdomen: soft, non distended, +right upper quad. tenderness   Extremities: warm, no edema   Nervous system:  Alert & Oriented X3  Psychiatric: Normal affect  Skin: No rashes or lesions    LABS:  05-17    137  |  106  |  11  ----------------------------<  286<H>  4.0   |  26  |  1.06    Ca    8.4      17 May 2023 05:40  Phos  3.4     05-16  Mg     2.3     05-16    TPro  6.6  /  Alb  2.8<L>  /  TBili  0.4  /  DBili  x   /  AST  15  /  ALT  29  /  AlkPhos  53  05-17    Creatinine Trend: 1.06<--, 1.03<--, 1.11<--                        12.4   7.08  )-----------( 213      ( 17 May 2023 05:40 )             36.0     PT/INR - ( 17 May 2023 07:28 )   PT: 12.8 sec;   INR: 1.07 ratio         PTT - ( 17 May 2023 07:28 )  PTT:33.1 sec    Lipid Panel:   Cardiac Enzymes:           RADIOLOGY: < from: CT Abdomen and Pelvis w/ IV Cont (05.16.23 @ 19:35) >  IMPRESSION: Mild hepatic steatosis. Mild cirrhotic morphology. Gallstones.    < end of copied text >  < from: US Abdomen Upper Quadrant Right (05.16.23 @ 17:58) >  IMPRESSION:  Fatty enlarged liver. Cholelithiasis without evidence of acute   cholecystitis    < end of copied text >      ECG [my interpretation]: 5/17/2023  9:45:46a  NSR HR 89    TELEMETRY:  N/a    ECHO:  pending     CHIEF COMPLAINT: abdominal pain    HPI:  53 y/o male with PMH of HTN, DM, Gastritis, Depression disorder, PAD, CAD on aspirin, Ruptured brain aneurysm with surgical decompression 10 years ago presents with abdominal pain x 1 week.  CT revealed gallbladder stones. Patient getting ready for lap cholecystectomy.  Upon eval, patient reports having SOB after he goes up 2 flights of stairs. No symptoms with 1 flight.   Patient denies chest pain, palpitations, syncope, LE edema, PND/orthopnea.     PAST MEDICAL & SURGICAL HISTORY:  Mild HTN  DM (diabetes mellitus)  Gastritis  Major depression    Allergies    penicillin (Hives)    Intolerances        MEDICATIONS  (STANDING):  aspirin enteric coated 81 milliGRAM(s) Oral daily  dextrose 5% + sodium chloride 0.45%. 1000 milliLiter(s) (140 mL/Hr) IV Continuous <Continuous>  dextrose 5%. 1000 milliLiter(s) (50 mL/Hr) IV Continuous <Continuous>  dextrose 50% Injectable 25 Gram(s) IV Push once  dextrose 50% Injectable 12.5 Gram(s) IV Push once  DULoxetine 60 milliGRAM(s) Oral daily  enoxaparin Injectable 40 milliGRAM(s) SubCutaneous every 24 hours  famotidine    Tablet 40 milliGRAM(s) Oral daily  glucagon  Injectable 1 milliGRAM(s) IntraMuscular once  insulin glargine Injectable (LANTUS) 15 Unit(s) SubCutaneous at bedtime  insulin lispro (ADMELOG) corrective regimen sliding scale   SubCutaneous every 6 hours  levoFLOXacin IVPB      levoFLOXacin IVPB 750 milliGRAM(s) IV Intermittent every 24 hours  lisinopril 2.5 milliGRAM(s) Oral daily  metroNIDAZOLE  IVPB      metroNIDAZOLE  IVPB 500 milliGRAM(s) IV Intermittent every 8 hours    MEDICATIONS  (PRN):  aluminum hydroxide/magnesium hydroxide/simethicone Suspension 30 milliLiter(s) Oral every 4 hours PRN Dyspepsia  dextrose Oral Gel 15 Gram(s) Oral once PRN Blood Glucose LESS THAN 70 milliGRAM(s)/deciliter  HYDROmorphone  Injectable 0.5 milliGRAM(s) IV Push every 4 hours PRN Moderate Pain (4 - 6)  ketorolac   Injectable 15 milliGRAM(s) IV Push every 6 hours PRN Moderate Pain (4 - 6)  ondansetron Injectable 4 milliGRAM(s) IV Push every 6 hours PRN Nausea      FAMILY HISTORY:  Father- alzheimer's  Mother-Covid    ***No family history of premature coronary artery disease or sudden cardiac death    SOCIAL HISTORY:  Smoking-denies  Alcohol-socially  Illicit Drug use-denies    REVIEW OF SYSTEMS:  Constitutional: [ ] fever, [ ]weight loss,  [ ]fatigue  Eyes: [ ] visual changes  Respiratory: [x ]shortness of breath;  [ ] cough, [ ]wheezing, [ ]chills, [ ]hemoptysis  Cardiovascular: [ ] chest pain, [ ]palpitations, [ ]dizziness,  [ ]leg swelling [ ]syncope  Gastrointestinal: [ x] abdominal pain, [ ]nausea, [ ]vomiting,  [ ]diarrhea   Genitourinary: [ ] dysuria, [ ] hematuria  Neurologic: [ ] headaches [ ] tremors  [ ] weakness [ ] lightheadedness  Skin: [ ] itching, [ ]burning, [ ] rashes  Endocrine: [ ] heat or cold intolerance  Musculoskeletal: [ ] joint pain or swelling; [ ] muscle, back, or extremity pain  Psychiatric: [ ] depression, [ ]anxiety, [ ]mood swings, or [ ]difficulty sleeping  Hematologic: [ ] easy bruising, [ ] bleeding gums     [ x] All others negative	  [ ] Unable to obtain    Vital Signs Last 24 Hrs  T(C): 36.4 (17 May 2023 09:29), Max: 36.7 (16 May 2023 16:20)  T(F): 97.6 (17 May 2023 09:29), Max: 98 (16 May 2023 16:20)  HR: 91 (17 May 2023 09:29) (88 - 100)  BP: 150/89 (17 May 2023 09:29) (109/69 - 150/89)  BP(mean): 105 (17 May 2023 04:55) (93 - 105)  RR: 17 (17 May 2023 09:29) (17 - 19)  SpO2: 99% (17 May 2023 09:29) (97% - 100%)    Parameters below as of 17 May 2023 09:29  Patient On (Oxygen Delivery Method): room air      I&O's Summary      PHYSICAL EXAM:  General: No acute distress  HEENT: EOMI  Neck:  No JVD  Lungs: Clear to auscultation bilaterally; No rales or wheezing  Heart: Regular rate and rhythm; No murmurs, rubs, or gallops  Abdomen: soft, non distended, +right upper quad. tenderness   Extremities: warm, no edema   Nervous system:  Alert & Oriented X3  Psychiatric: Normal affect  Skin: No rashes or lesions    LABS:  05-17    137  |  106  |  11  ----------------------------<  286<H>  4.0   |  26  |  1.06    Ca    8.4      17 May 2023 05:40  Phos  3.4     05-16  Mg     2.3     05-16    TPro  6.6  /  Alb  2.8<L>  /  TBili  0.4  /  DBili  x   /  AST  15  /  ALT  29  /  AlkPhos  53  05-17    Creatinine Trend: 1.06<--, 1.03<--, 1.11<--                        12.4   7.08  )-----------( 213      ( 17 May 2023 05:40 )             36.0     PT/INR - ( 17 May 2023 07:28 )   PT: 12.8 sec;   INR: 1.07 ratio         PTT - ( 17 May 2023 07:28 )  PTT:33.1 sec    Lipid Panel:   Cardiac Enzymes:           RADIOLOGY: < from: CT Abdomen and Pelvis w/ IV Cont (05.16.23 @ 19:35) >  IMPRESSION: Mild hepatic steatosis. Mild cirrhotic morphology. Gallstones.    < end of copied text >  < from: US Abdomen Upper Quadrant Right (05.16.23 @ 17:58) >  IMPRESSION:  Fatty enlarged liver. Cholelithiasis without evidence of acute   cholecystitis    < end of copied text >      ECG [my interpretation]: 5/17/2023  9:45:46a  NSR HR 89    TELEMETRY:  N/a    ECHO:  pending

## 2023-05-17 NOTE — CONSULT NOTE ADULT - ATTENDING COMMENTS
53 y/o male with PMH of HTN, DM, Gastritis, Diabetic neuropathy, PAD, CAD on aspirin, Ruptured brain aneurysm with surgical decompression 10 years ago presents with Abd Pain  x 1 wk . Pain is in the RUQ , very sharp, persistent  and disabling.  Not worse with food, no clear exacerbating factors. CT abdomen showed gall stones , Patient was evaluated by surgery and was started on IV antibiotics , Medicine is consulted for optimization prior to lap cholecystectomy.     # medical optimization for lap calvin  - Patient's Revised Cardiac Risk Index (RCRI) score is  Class 3 risk . Patient is at 10.1 % risk ( moderate risk )of  adverse perioperative cardiac events undergoing moderate risk surgery.  - EKG NSR   - METS < 4   - Given PMH of CAD, PAD , long standing DM , diabetic neuropathy, Recommend cardiology consult for medical optimization.   - ECHO     # Migraine   - Recommend migraine cocktail if symptoms persists ( Benadryl 25 mg IV , metoclopramide 10 mg IV , valproate  mg ) .     # DM   -Patient is on Novologue 25 units TID premeals and Basaglar 35 units at night   - Sliding scale while NPO , consider starting lantus 15 units if FS is elevated   - Monitor FS and Adjust as needed  - Recommend checking HBA1c  - Recommend nutrition consult    # HTN   - patient is lisinopril 2.5 mg at home  - Resume home meds and monitor BP     #CAD   - Resume aspirin     # gastritis   - Resume famotidine    # Diabetic neuropathy  -Resume Duloxetine     # DVT prophylaxis   - As per surgery #Cholelithiasis          #Uncontrolled type 2 DM   #Diabetic neuropathy  #PAD in setting of uncontrolled DM   #Hx ruptured brain aneurysm s/p surgical decompression  #Gastritis  #Migraine  #HTN    52yM with PMH of uncontrolled type 2 DM, gastritis, PAD, and ruptured brain aneurysm s/p surgical decompression 10 years ago, who presented with abdominal pain, found with gallstones.   Patient seen at bedside with wife present. States abdominal pain has been intermittent over the last two months, but progressively worsened recently. States that he takes duloxetine for diabetic neuropathy. Patient reports that his pain is mild now, but he has a migraine headache and nausea. He received Zofran, but states it did not help. Per patient, surgery provided an option for lap calvin now vs at a later date, and patient opted for now. Medicine consulted for pre-op evaluation and medical clearance.     -RCRI: Class III risk, 0.4% risk of MI/cardiac arrest intraoperative/within 30 days post-op, METS <4; patient is moderate risk for moderate risk surgery  -Would recommend cardiac clearance, as patient has uncontrolled DM (last known A1c was 11% last year, per patient) and PAD   -Recommend echocardiogram, no baseline reported  -If patient with persistent migraine, would recommend migraine cocktail - benadryl, metoclopramide, and valproate  -Recommend checking HgbA1c; consider endocrine consult pending result  -Recommend holding patient's home insulin regimen: Novolog 25u QAC and Basaglar 35u QHS, and starting Lantus 15u QHS for now as only AM glucose has been elevated, can titrate as needed  -Tight glycemic control, BG should range 140-180   -Recommend nutrition consult, per patient's wife, he does not follow a healthy diabetic diet  -Continue home antihypertensive, lisinopril 2.5mg  -Continue duloxetine for diabetic neuropathy   -Medicine will continue to follow

## 2023-05-17 NOTE — CONSULT NOTE ADULT - SUBJECTIVE AND OBJECTIVE BOX
CHIEF COMPLAINT: Abdominal pain     HPI and Hospital course:    51 y/o male with PMH of HTN, DM, Gastritis, Depression disorder, PAD, CAD on aspirin, Ruptured brain aneurysm with surgical decompression 10 years ago presents with Abd Pain  x 1 wk . Pain is in the RUQ , very sharp, persistent  and disabling.  Not worse with food, no clear exacerbating factors. No nausea, vomiting, fevers, chills. No hx of abdominal surgeries. No other symptoms. CT abdomen showed gall stones , Patient was evaluated by surgery and was started on IV antibiotics , Medicine is consulted for optimization prior to lap cholecystectomy.         PAST MEDICAL & SURGICAL HISTORY:  Mild HTN      DM (diabetes mellitus)      Gastritis      Major depression          Allergies    penicillin (Hives)    Intolerances        MEDICATIONS  (STANDING):  dextrose 5% + sodium chloride 0.45%. 1000 milliLiter(s) (140 mL/Hr) IV Continuous <Continuous>  dextrose 5%. 1000 milliLiter(s) (50 mL/Hr) IV Continuous <Continuous>  dextrose 50% Injectable 25 Gram(s) IV Push once  dextrose 50% Injectable 12.5 Gram(s) IV Push once  enoxaparin Injectable 40 milliGRAM(s) SubCutaneous every 24 hours  glucagon  Injectable 1 milliGRAM(s) IntraMuscular once  insulin lispro (ADMELOG) corrective regimen sliding scale   SubCutaneous every 6 hours  levoFLOXacin IVPB 750 milliGRAM(s) IV Intermittent every 24 hours  levoFLOXacin IVPB      metroNIDAZOLE  IVPB      metroNIDAZOLE  IVPB 500 milliGRAM(s) IV Intermittent every 8 hours    MEDICATIONS  (PRN):  aluminum hydroxide/magnesium hydroxide/simethicone Suspension 30 milliLiter(s) Oral every 4 hours PRN Dyspepsia  dextrose Oral Gel 15 Gram(s) Oral once PRN Blood Glucose LESS THAN 70 milliGRAM(s)/deciliter  HYDROmorphone  Injectable 0.5 milliGRAM(s) IV Push every 4 hours PRN Moderate Pain (4 - 6)  ketorolac   Injectable 15 milliGRAM(s) IV Push every 6 hours PRN Moderate Pain (4 - 6)  ondansetron Injectable 4 milliGRAM(s) IV Push every 6 hours PRN Nausea      FAMILY HISTORY:    No family history of premature coronary artery disease or sudden cardiac death    SOCIAL HISTORY:  Smoking- no   Alcohol- occcasionally  Illicit Drug use- none    REVIEW OF SYSTEMS:  Constitutional: [ ] fever, [ ]weight loss,  [ ]fatigue  Eyes: [ ] visual changes  Respiratory: [ ]shortness of breath;  [ ] cough, [ ]wheezing, [ ]chills, [ ]hemoptysis  Cardiovascular: [ ] chest pain, [ ]palpitations, [ ]dizziness,  [ ]leg swelling [ ]syncope  Gastrointestinal: [ +] abdominal pain, [ ]nausea, [ ]vomiting,  [ ]diarrhea   Genitourinary: [ ] dysuria, [ ] hematuria  Neurologic: [ ] headaches [ ] tremors  [ ] weakness [ ] lightheadedness  Skin: [ ] itching, [ ]burning, [ ] rashes  Endocrine: [ ] heat or cold intolerance  Musculoskeletal: [ ] joint pain or swelling; [ ] muscle, back, or extremity pain  Psychiatric: [ ] depression, [ ]anxiety, [ ]mood swings, or [ ]difficulty sleeping  Hematologic: [ ] easy bruising, [ ] bleeding gums       [ x] All others negative	      Vital Signs Last 24 Hrs  T(C): 36.4 (17 May 2023 09:29), Max: 36.7 (16 May 2023 16:20)  T(F): 97.6 (17 May 2023 09:29), Max: 98 (16 May 2023 16:20)  HR: 91 (17 May 2023 09:29) (88 - 100)  BP: 150/89 (17 May 2023 09:29) (109/69 - 150/89)  BP(mean): 105 (17 May 2023 04:55) (93 - 105)  RR: 17 (17 May 2023 09:29) (17 - 19)  SpO2: 99% (17 May 2023 09:29) (97% - 100%)    Parameters below as of 17 May 2023 09:29  Patient On (Oxygen Delivery Method): room air      I&O's Summary      PHYSICAL EXAM:  General: No acute distress  HEENT: EOMI, PERRL  Neck: Supple, No JVD  Lungs: Clear to auscultation bilaterally; No rales or wheezing  Heart: Regular rate and rhythm; No murmurs, rubs, or gallops  Abdomen: RUQ tenderness,  bowel sounds present  Extremities: No clubbing, cyanosis, or edema  Nervous system:  Alert & Oriented X3, no focal deficits  Psychiatric: Normal affect  Skin: No rashes or lesions      LABS:  05-17    137  |  106  |  11  ----------------------------<  286<H>  4.0   |  26  |  1.06    Ca    8.4      17 May 2023 05:40  Phos  3.4     05-16  Mg     2.3     05-16    TPro  6.6  /  Alb  2.8<L>  /  TBili  0.4  /  DBili  x   /  AST  15  /  ALT  29  /  AlkPhos  53  05-17    Creatinine Trend: 1.06<--, 1.03<--, 1.11<--                        12.4   7.08  )-----------( 213      ( 17 May 2023 05:40 )             36.0     PT/INR - ( 17 May 2023 07:28 )   PT: 12.8 sec;   INR: 1.07 ratio         PTT - ( 17 May 2023 07:28 )  PTT:33.1 sec              RADIOLOGY: CT abdomen : Gall stones    ECG [my interpretation]: NSR

## 2023-05-17 NOTE — PROGRESS NOTE ADULT - ASSESSMENT
52M with PMHx of HTN, DM, Gastritis  a/w biliary colic     medical clearance  lap possible open cholecystectomy planning  NPO/ IVF  IV ABx  DVT PPx  Discussed with Dr. Naik

## 2023-05-18 ENCOUNTER — TRANSCRIPTION ENCOUNTER (OUTPATIENT)
Age: 53
End: 2023-05-18

## 2023-05-18 LAB
ANION GAP SERPL CALC-SCNC: 3 MMOL/L — LOW (ref 5–17)
BUN SERPL-MCNC: 8 MG/DL — SIGNIFICANT CHANGE UP (ref 7–18)
CALCIUM SERPL-MCNC: 8.5 MG/DL — SIGNIFICANT CHANGE UP (ref 8.4–10.5)
CHLORIDE SERPL-SCNC: 106 MMOL/L — SIGNIFICANT CHANGE UP (ref 96–108)
CO2 SERPL-SCNC: 30 MMOL/L — SIGNIFICANT CHANGE UP (ref 22–31)
CREAT SERPL-MCNC: 1.1 MG/DL — SIGNIFICANT CHANGE UP (ref 0.5–1.3)
EGFR: 81 ML/MIN/1.73M2 — SIGNIFICANT CHANGE UP
GLUCOSE BLDC GLUCOMTR-MCNC: 182 MG/DL — HIGH (ref 70–99)
GLUCOSE BLDC GLUCOMTR-MCNC: 214 MG/DL — HIGH (ref 70–99)
GLUCOSE BLDC GLUCOMTR-MCNC: 218 MG/DL — HIGH (ref 70–99)
GLUCOSE BLDC GLUCOMTR-MCNC: 219 MG/DL — HIGH (ref 70–99)
GLUCOSE BLDC GLUCOMTR-MCNC: 223 MG/DL — HIGH (ref 70–99)
GLUCOSE BLDC GLUCOMTR-MCNC: 225 MG/DL — HIGH (ref 70–99)
GLUCOSE SERPL-MCNC: 224 MG/DL — HIGH (ref 70–99)
HCT VFR BLD CALC: 38.3 % — LOW (ref 39–50)
HGB BLD-MCNC: 13.1 G/DL — SIGNIFICANT CHANGE UP (ref 13–17)
MCHC RBC-ENTMCNC: 30.1 PG — SIGNIFICANT CHANGE UP (ref 27–34)
MCHC RBC-ENTMCNC: 34.2 GM/DL — SIGNIFICANT CHANGE UP (ref 32–36)
MCV RBC AUTO: 88 FL — SIGNIFICANT CHANGE UP (ref 80–100)
NRBC # BLD: 0 /100 WBCS — SIGNIFICANT CHANGE UP (ref 0–0)
PLATELET # BLD AUTO: 222 K/UL — SIGNIFICANT CHANGE UP (ref 150–400)
POTASSIUM SERPL-MCNC: 4.2 MMOL/L — SIGNIFICANT CHANGE UP (ref 3.5–5.3)
POTASSIUM SERPL-SCNC: 4.2 MMOL/L — SIGNIFICANT CHANGE UP (ref 3.5–5.3)
RBC # BLD: 4.35 M/UL — SIGNIFICANT CHANGE UP (ref 4.2–5.8)
RBC # FLD: 11.9 % — SIGNIFICANT CHANGE UP (ref 10.3–14.5)
SODIUM SERPL-SCNC: 139 MMOL/L — SIGNIFICANT CHANGE UP (ref 135–145)
WBC # BLD: 5.28 K/UL — SIGNIFICANT CHANGE UP (ref 3.8–10.5)
WBC # FLD AUTO: 5.28 K/UL — SIGNIFICANT CHANGE UP (ref 3.8–10.5)

## 2023-05-18 PROCEDURE — 99232 SBSQ HOSP IP/OBS MODERATE 35: CPT | Mod: GC

## 2023-05-18 PROCEDURE — 99232 SBSQ HOSP IP/OBS MODERATE 35: CPT | Mod: 57

## 2023-05-18 RX ORDER — CHLORHEXIDINE GLUCONATE 213 G/1000ML
1 SOLUTION TOPICAL DAILY
Refills: 0 | Status: COMPLETED | OUTPATIENT
Start: 2023-05-18 | End: 2023-05-20

## 2023-05-18 RX ADMIN — Medication 81 MILLIGRAM(S): at 11:32

## 2023-05-18 RX ADMIN — ONDANSETRON 4 MILLIGRAM(S): 8 TABLET, FILM COATED ORAL at 15:19

## 2023-05-18 RX ADMIN — Medication 4: at 23:56

## 2023-05-18 RX ADMIN — DULOXETINE HYDROCHLORIDE 60 MILLIGRAM(S): 30 CAPSULE, DELAYED RELEASE ORAL at 11:32

## 2023-05-18 RX ADMIN — ENOXAPARIN SODIUM 40 MILLIGRAM(S): 100 INJECTION SUBCUTANEOUS at 23:07

## 2023-05-18 RX ADMIN — Medication 4: at 11:36

## 2023-05-18 RX ADMIN — FAMOTIDINE 40 MILLIGRAM(S): 10 INJECTION INTRAVENOUS at 11:32

## 2023-05-18 RX ADMIN — INSULIN GLARGINE 15 UNIT(S): 100 INJECTION, SOLUTION SUBCUTANEOUS at 21:34

## 2023-05-18 RX ADMIN — Medication 4: at 16:32

## 2023-05-18 RX ADMIN — Medication 100 MILLIGRAM(S): at 21:34

## 2023-05-18 RX ADMIN — Medication 15 MILLIGRAM(S): at 23:07

## 2023-05-18 RX ADMIN — Medication 4: at 06:11

## 2023-05-18 RX ADMIN — LISINOPRIL 2.5 MILLIGRAM(S): 2.5 TABLET ORAL at 05:32

## 2023-05-18 RX ADMIN — Medication 100 MILLIGRAM(S): at 14:50

## 2023-05-18 RX ADMIN — Medication 100 MILLIGRAM(S): at 05:32

## 2023-05-18 RX ADMIN — ONDANSETRON 4 MILLIGRAM(S): 8 TABLET, FILM COATED ORAL at 21:28

## 2023-05-18 RX ADMIN — Medication 15 MILLIGRAM(S): at 23:27

## 2023-05-18 NOTE — PROGRESS NOTE ADULT - ASSESSMENT
52 year old male with acute cholecystitis    - laparoscopic cholecystectomy planning  - medical clearance pending  - repeat labs  - discuss with Dr. Naik  52 year old male with acute cholecystitis    - laparoscopic cholecystectomy planning  - medical and cardiac clearance pending  - repeat labs  - discuss with Dr. Naik

## 2023-05-18 NOTE — PROGRESS NOTE ADULT - SUBJECTIVE AND OBJECTIVE BOX
Patient seen and examined at bedside  States he is feeling better, but still has pain  Tolerating clear liquid diet  Denies nausea and vomiting     Vital Signs Last 24 Hrs  T(F): 98.3 (05-18-23 @ 05:08), Max: 98.3 (05-18-23 @ 05:08)  HR: 94 (05-18-23 @ 05:08)  BP: 119/75 (05-18-23 @ 05:08)  RR: 18 (05-18-23 @ 05:08)  SpO2: 97% (05-18-23 @ 05:08)  POCT Blood Glucose.: 223 mg/dL (18 May 2023 07:47)    GENERAL: Alert, NAD  CHEST/LUNG: respirations nonlabored  ABDOMEN: soft, +right upper quadrant tenderness to palpation, Nondistended  EXTREMITIES:  no calf tenderness, No edema    I&O's Detail    LABS:                        12.4   7.08  )-----------( 213      ( 17 May 2023 05:40 )             36.0     05-17    137  |  106  |  11  ----------------------------<  286<H>  4.0   |  26  |  1.06    Ca    8.4      17 May 2023 05:40  Phos  3.4     05-16  Mg     2.3     05-16    TPro  6.6  /  Alb  2.8<L>  /  TBili  0.4  /  DBili  x   /  AST  15  /  ALT  29  /  AlkPhos  53  05-17    PT/INR - ( 17 May 2023 07:28 )   PT: 12.8 sec;   INR: 1.07 ratio       PTT - ( 17 May 2023 07:28 )  PTT:33.1 sec

## 2023-05-18 NOTE — PROGRESS NOTE ADULT - ASSESSMENT
53 y/o male with PMH of HTN, DM, Gastritis, Diabetic neuropathy, PAD, CAD on aspirin, Ruptured brain aneurysm with surgical decompression 10 years ago presents with Abd Pain  x 1 wk . Pain is in the RUQ , very sharp, persistent  and disabling.  Not worse with food, no clear exacerbating factors. CT abdomen showed gall stones , Patient was evaluated by surgery and was started on IV antibiotics , Medicine is consulted for optimization prior to lap cholecystectomy.     # medical optimization for lap calvin  - Refer to cardiology note for optimization   - Echo with normal ef , grade 1 diastolic dysfunction     # Migraine   - Resolved    # DM   -Patient is on Novologue 25 units TID premeals and Basaglar 35 units at night   - Sliding scale, lantus 15 units.   - Monitor FS and Adjust as needed  - Recommend checking HBA1c  - Recommend nutrition consult    # HTN   - patient is lisinopril 2.5 mg at home  - CW home meds and monitor BP     #CAD   - CW aspirin   - as per cardiology , will need to start on statin as outpatient    # gastritis   - CW famotidine    # Diabetic neuropathy  -CW Duloxetine     # DVT prophylaxis   - As per surgery    53 y/o male with PMH of HTN, DM, Gastritis, Diabetic neuropathy, PAD, CAD on aspirin, Ruptured brain aneurysm with surgical decompression 10 years ago presents with Abd Pain  x 1 wk . Pain is in the RUQ , very sharp, persistent  and disabling.  Not worse with food, no clear exacerbating factors. CT abdomen showed gall stones , Patient was evaluated by surgery and was started on IV antibiotics , Medicine is consulted for optimization prior to lap cholecystectomy.     # medical optimization for lap calvin  - Refer to cardiology note for optimization   - Echo with normal ef , grade 1 diastolic dysfunction     # Migraine   - Resolved    # DM   -Patient is on Novologue 25 units TID premeals and Basaglar 35 units at night   - Sliding scale, lantus 15 units.   - Monitor FS and Adjust as needed  -  HBA1c 10.8   - Recommend nutrition consult and Endocrinology follow up     # HTN   - patient is lisinopril 2.5 mg at home  - CW home meds and monitor BP     #CAD   - CW aspirin   - as per cardiology , will need to start on statin as outpatient    # gastritis   - CW famotidine    # Diabetic neuropathy  -CW Duloxetine     # DVT prophylaxis   - As per surgery    51 y/o male with PMH of HTN, DM, Gastritis, Diabetic neuropathy, PAD, CAD on aspirin, Ruptured brain aneurysm with surgical decompression 10 years ago presents with Abd Pain  x 1 wk . Pain is in the RUQ , very sharp, persistent  and disabling.  Not worse with food, no clear exacerbating factors. CT abdomen showed gall stones , Patient was evaluated by surgery and was started on IV antibiotics , Medicine is consulted for optimization prior to lap cholecystectomy.     # medical optimization for lap calvin  - Refer to cardiology note for optimization   - Echo with normal ef , grade 1 diastolic dysfunction     # Migraine   - Resolved    # DM   -Patient is on Novologue 25 units TID premeals and Basaglar 35 units at night   - Sliding scale, lantus 15 units.   - If you are to keep him NPO after midnight , recommend to cut lantus to 5 units.   - Monitor FS and Adjust as needed  -  HBA1c 10.8   - Recommend nutrition consult .  -Recommend Endocrinology consult and  follow up     # HTN   - patient is lisinopril 2.5 mg at home  - CW home meds and monitor BP     #CAD   - CW aspirin   - as per cardiology , will need to start on statin as outpatient    # gastritis   - CW famotidine    # Diabetic neuropathy  -CW Duloxetine     # DVT prophylaxis   - As per surgery

## 2023-05-18 NOTE — PROGRESS NOTE ADULT - SUBJECTIVE AND OBJECTIVE BOX
INTERVAL HPI/OVERNIGHT EVENTS:   Patient examined bedside, he is comfortable , still c/o RUQ pain , headache and nausea resolved , Echo was done and patient was evaluated by cardiology     REVIEW OF SYSTEMS:  CONSTITUTIONAL: No fever, weight loss, or fatigue  RESPIRATORY: No cough, wheezing, chills or hemoptysis; No shortness of breath  CARDIOVASCULAR: No chest pain, palpitations, dizziness, or leg swelling  GASTROINTESTINAL:+ abdominal pain. No nausea, vomiting, or hematemesis; No diarrhea or constipation. No melena or hematochezia.  GENITOURINARY: No dysuria or hematuria, urinary frequency  NEUROLOGICAL: No headaches, memory loss, loss of strength, numbness, or tremors  SKIN: No itching, burning, rashes, or lesions     MEDICATIONS  (STANDING):  aspirin enteric coated 81 milliGRAM(s) Oral daily  dextrose 5% + sodium chloride 0.45%. 1000 milliLiter(s) (140 mL/Hr) IV Continuous <Continuous>  dextrose 5%. 1000 milliLiter(s) (50 mL/Hr) IV Continuous <Continuous>  dextrose 50% Injectable 25 Gram(s) IV Push once  dextrose 50% Injectable 12.5 Gram(s) IV Push once  DULoxetine 60 milliGRAM(s) Oral daily  enoxaparin Injectable 40 milliGRAM(s) SubCutaneous every 24 hours  famotidine    Tablet 40 milliGRAM(s) Oral daily  glucagon  Injectable 1 milliGRAM(s) IntraMuscular once  insulin glargine Injectable (LANTUS) 15 Unit(s) SubCutaneous at bedtime  insulin lispro (ADMELOG) corrective regimen sliding scale   SubCutaneous every 6 hours  levoFLOXacin IVPB 750 milliGRAM(s) IV Intermittent every 24 hours  levoFLOXacin IVPB      lisinopril 2.5 milliGRAM(s) Oral daily  metroNIDAZOLE  IVPB      metroNIDAZOLE  IVPB 500 milliGRAM(s) IV Intermittent every 8 hours    MEDICATIONS  (PRN):  acetaminophen 300 mG/butalbital 50 mG/ caffeine 40 mG 1 Capsule(s) Oral every 4 hours PRN headache  aluminum hydroxide/magnesium hydroxide/simethicone Suspension 30 milliLiter(s) Oral every 4 hours PRN Dyspepsia  dextrose Oral Gel 15 Gram(s) Oral once PRN Blood Glucose LESS THAN 70 milliGRAM(s)/deciliter  HYDROmorphone  Injectable 0.5 milliGRAM(s) IV Push every 4 hours PRN Moderate Pain (4 - 6)  ketorolac   Injectable 15 milliGRAM(s) IV Push every 6 hours PRN Moderate Pain (4 - 6)  ondansetron Injectable 4 milliGRAM(s) IV Push every 6 hours PRN Nausea      Vital Signs Last 24 Hrs  T(C): 36.8 (18 May 2023 05:08), Max: 36.8 (18 May 2023 05:08)  T(F): 98.3 (18 May 2023 05:08), Max: 98.3 (18 May 2023 05:08)  HR: 94 (18 May 2023 05:08) (90 - 96)  BP: 119/75 (18 May 2023 05:08) (119/75 - 150/89)  BP(mean): --  RR: 18 (18 May 2023 05:08) (17 - 18)  SpO2: 97% (18 May 2023 05:08) (97% - 99%)    Parameters below as of 18 May 2023 05:08  Patient On (Oxygen Delivery Method): room air        PHYSICAL EXAMINATION:  GENERAL: NAD, well built  HEAD:  Atraumatic, Normocephalic  EYES:  conjunctiva and sclera clear  NECK: Supple, No JVD, Normal thyroid  CHEST/LUNG: Clear to auscultation. Clear to percussion bilaterally; No rales, rhonchi, wheezing, or rubs  HEART: Regular rate and rhythm; No murmurs, rubs, or gallops  ABDOMEN: Soft, +RUQ tenderness, Nondistended; Bowel sounds present  NERVOUS SYSTEM:  Alert & Oriented X3,    EXTREMITIES:  2+ Peripheral Pulses, No clubbing, cyanosis, or edema  SKIN: warm dry                          12.4   7.08  )-----------( 213      ( 17 May 2023 05:40 )             36.0     05-17    137  |  106  |  11  ----------------------------<  286<H>  4.0   |  26  |  1.06    Ca    8.4      17 May 2023 05:40  Phos  3.4     05-16  Mg     2.3     05-16    TPro  6.6  /  Alb  2.8<L>  /  TBili  0.4  /  DBili  x   /  AST  15  /  ALT  29  /  AlkPhos  53  05-17    LIVER FUNCTIONS - ( 17 May 2023 05:40 )  Alb: 2.8 g/dL / Pro: 6.6 g/dL / ALK PHOS: 53 U/L / ALT: 29 U/L DA / AST: 15 U/L / GGT: x               PT/INR - ( 17 May 2023 07:28 )   PT: 12.8 sec;   INR: 1.07 ratio         PTT - ( 17 May 2023 07:28 )  PTT:33.1 sec        CAPILLARY BLOOD GLUCOSE  CAPILLARY BLOOD GLUCOSE      POCT Blood Glucose.: 223 mg/dL (18 May 2023 07:47)    CAPILLARY BLOOD GLUCOSE      POCT Blood Glucose.: 223 mg/dL (18 May 2023 07:47)  POCT Blood Glucose.: 219 mg/dL (18 May 2023 05:57)  POCT Blood Glucose.: 235 mg/dL (17 May 2023 23:35)  POCT Blood Glucose.: 267 mg/dL (17 May 2023 21:59)  POCT Blood Glucose.: 188 mg/dL (17 May 2023 17:33)  POCT Blood Glucose.: 272 mg/dL (17 May 2023 12:02)

## 2023-05-18 NOTE — PROGRESS NOTE ADULT - ATTENDING COMMENTS
#Cholelithiasis          #Uncontrolled type 2 DM   #Diabetic neuropathy  #PAD in setting of uncontrolled DM   #Hx ruptured brain aneurysm s/p surgical decompression  #Gastritis  #Migraine  #HTN    52yM with PMH of uncontrolled type 2 DM, gastritis, PAD, and ruptured brain aneurysm s/p surgical decompression 10 years ago, who presented with abdominal pain, found with gallstones.   Patient seen at bedside with wife present. Improved abdominal pain, 4/10 in severity, nontender to palpation. Patient anticipating surgery tomorrow. Explained that his blood glucose is still elevated and will need insulin adjustment.     -Medical clearance in initial consult note   -Cardiac clearance obtained, echo with G1DD and EF >55%, trace MR   -Migraine resolved,   -HgbA1c 10.8%; consider endocrine and nutrition consult   -Recommend holding patient's home insulin regimen: Novolog 25u QAC and Basaglar 35u QHS  -Continue Lantus 15u QHS, if patient is NPO at midnight for OR tomorrow, then 5 units Lantus tonight  -Tight glycemic control, BG should range 140-180; persistently over 200    -Recommend nutrition consult, per patient's wife, he does not follow a healthy diabetic diet  -Continue home antihypertensive, lisinopril 2.5mg  -Continue duloxetine for diabetic neuropathy   -Medicine will continue to follow

## 2023-05-19 ENCOUNTER — RESULT REVIEW (OUTPATIENT)
Age: 53
End: 2023-05-19

## 2023-05-19 LAB
ABO RH CONFIRMATION: SIGNIFICANT CHANGE UP
ALBUMIN SERPL ELPH-MCNC: 2.8 G/DL — LOW (ref 3.5–5)
ALP SERPL-CCNC: 49 U/L — SIGNIFICANT CHANGE UP (ref 40–120)
ALT FLD-CCNC: 33 U/L DA — SIGNIFICANT CHANGE UP (ref 10–60)
ANION GAP SERPL CALC-SCNC: 6 MMOL/L — SIGNIFICANT CHANGE UP (ref 5–17)
AST SERPL-CCNC: 38 U/L — SIGNIFICANT CHANGE UP (ref 10–40)
BILIRUB SERPL-MCNC: 0.6 MG/DL — SIGNIFICANT CHANGE UP (ref 0.2–1.2)
BLD GP AB SCN SERPL QL: SIGNIFICANT CHANGE UP
BUN SERPL-MCNC: 8 MG/DL — SIGNIFICANT CHANGE UP (ref 7–18)
CALCIUM SERPL-MCNC: 8.5 MG/DL — SIGNIFICANT CHANGE UP (ref 8.4–10.5)
CHLORIDE SERPL-SCNC: 107 MMOL/L — SIGNIFICANT CHANGE UP (ref 96–108)
CO2 SERPL-SCNC: 23 MMOL/L — SIGNIFICANT CHANGE UP (ref 22–31)
CREAT SERPL-MCNC: 1.07 MG/DL — SIGNIFICANT CHANGE UP (ref 0.5–1.3)
EGFR: 84 ML/MIN/1.73M2 — SIGNIFICANT CHANGE UP
GLUCOSE BLDC GLUCOMTR-MCNC: 194 MG/DL — HIGH (ref 70–99)
GLUCOSE BLDC GLUCOMTR-MCNC: 202 MG/DL — HIGH (ref 70–99)
GLUCOSE BLDC GLUCOMTR-MCNC: 226 MG/DL — HIGH (ref 70–99)
GLUCOSE BLDC GLUCOMTR-MCNC: 315 MG/DL — HIGH (ref 70–99)
GLUCOSE SERPL-MCNC: 192 MG/DL — HIGH (ref 70–99)
HCT VFR BLD CALC: 40.9 % — SIGNIFICANT CHANGE UP (ref 39–50)
HGB BLD-MCNC: 14.3 G/DL — SIGNIFICANT CHANGE UP (ref 13–17)
MCHC RBC-ENTMCNC: 30.6 PG — SIGNIFICANT CHANGE UP (ref 27–34)
MCHC RBC-ENTMCNC: 35 GM/DL — SIGNIFICANT CHANGE UP (ref 32–36)
MCV RBC AUTO: 87.4 FL — SIGNIFICANT CHANGE UP (ref 80–100)
NRBC # BLD: 0 /100 WBCS — SIGNIFICANT CHANGE UP (ref 0–0)
PLATELET # BLD AUTO: 237 K/UL — SIGNIFICANT CHANGE UP (ref 150–400)
POTASSIUM SERPL-MCNC: 4.1 MMOL/L — SIGNIFICANT CHANGE UP (ref 3.5–5.3)
POTASSIUM SERPL-SCNC: 4.1 MMOL/L — SIGNIFICANT CHANGE UP (ref 3.5–5.3)
PROT SERPL-MCNC: 7 G/DL — SIGNIFICANT CHANGE UP (ref 6–8.3)
RBC # BLD: 4.68 M/UL — SIGNIFICANT CHANGE UP (ref 4.2–5.8)
RBC # FLD: 12.1 % — SIGNIFICANT CHANGE UP (ref 10.3–14.5)
SODIUM SERPL-SCNC: 136 MMOL/L — SIGNIFICANT CHANGE UP (ref 135–145)
WBC # BLD: 4.91 K/UL — SIGNIFICANT CHANGE UP (ref 3.8–10.5)
WBC # FLD AUTO: 4.91 K/UL — SIGNIFICANT CHANGE UP (ref 3.8–10.5)

## 2023-05-19 PROCEDURE — 47562 LAPAROSCOPIC CHOLECYSTECTOMY: CPT

## 2023-05-19 PROCEDURE — S2900 ROBOTIC SURGICAL SYSTEM: CPT | Mod: NC

## 2023-05-19 PROCEDURE — 99232 SBSQ HOSP IP/OBS MODERATE 35: CPT | Mod: GC

## 2023-05-19 PROCEDURE — 88304 TISSUE EXAM BY PATHOLOGIST: CPT | Mod: 26

## 2023-05-19 DEVICE — LIGATING CLIPS WECK HEMOLOK POLYMER LARGE (PURPLE) 6: Type: IMPLANTABLE DEVICE | Status: FUNCTIONAL

## 2023-05-19 DEVICE — LIGATING CLIPS WECK HEMOLOK POLYMER MEDIUM-LARGE (GREEN) 6: Type: IMPLANTABLE DEVICE | Status: FUNCTIONAL

## 2023-05-19 RX ORDER — HYDROMORPHONE HYDROCHLORIDE 2 MG/ML
0.5 INJECTION INTRAMUSCULAR; INTRAVENOUS; SUBCUTANEOUS
Refills: 0 | Status: DISCONTINUED | OUTPATIENT
Start: 2023-05-19 | End: 2023-05-19

## 2023-05-19 RX ORDER — INDOCYANINE GREEN 25 MG
3 KIT INTRAVASCULAR; INTRAVENOUS ONCE
Refills: 0 | Status: DISCONTINUED | OUTPATIENT
Start: 2023-05-19 | End: 2023-05-20

## 2023-05-19 RX ORDER — SODIUM CHLORIDE 9 MG/ML
1000 INJECTION, SOLUTION INTRAVENOUS
Refills: 0 | Status: DISCONTINUED | OUTPATIENT
Start: 2023-05-19 | End: 2023-05-19

## 2023-05-19 RX ADMIN — ONDANSETRON 4 MILLIGRAM(S): 8 TABLET, FILM COATED ORAL at 05:25

## 2023-05-19 RX ADMIN — CHLORHEXIDINE GLUCONATE 1 APPLICATION(S): 213 SOLUTION TOPICAL at 11:58

## 2023-05-19 RX ADMIN — Medication 100 MILLIGRAM(S): at 13:01

## 2023-05-19 RX ADMIN — Medication 8: at 23:35

## 2023-05-19 RX ADMIN — INSULIN GLARGINE 15 UNIT(S): 100 INJECTION, SOLUTION SUBCUTANEOUS at 21:27

## 2023-05-19 RX ADMIN — Medication 4: at 11:58

## 2023-05-19 RX ADMIN — Medication 2: at 05:25

## 2023-05-19 RX ADMIN — LISINOPRIL 2.5 MILLIGRAM(S): 2.5 TABLET ORAL at 05:26

## 2023-05-19 RX ADMIN — HYDROMORPHONE HYDROCHLORIDE 0.5 MILLIGRAM(S): 2 INJECTION INTRAMUSCULAR; INTRAVENOUS; SUBCUTANEOUS at 19:50

## 2023-05-19 RX ADMIN — HYDROMORPHONE HYDROCHLORIDE 0.5 MILLIGRAM(S): 2 INJECTION INTRAMUSCULAR; INTRAVENOUS; SUBCUTANEOUS at 19:31

## 2023-05-19 RX ADMIN — Medication 100 MILLIGRAM(S): at 05:26

## 2023-05-19 NOTE — PROGRESS NOTE ADULT - ASSESSMENT
52 year old male with acute cholecystitis    - Robotic Assisted Laparoscopic cholecystectomy today   - Cardiac clearance in chart   - NPO  - IVF   - Abx   - Pain medication PRN   - OOB/ Ambulate   - DVT ppx

## 2023-05-19 NOTE — PROGRESS NOTE ADULT - SUBJECTIVE AND OBJECTIVE BOX
INTERVAL HPI/OVERNIGHT EVENTS:    Pt seen and examined at bedside. Offers no acute complaints at this time. Pt is NPO since midnight.     Vital Signs Last 24 Hrs  T(C): 36.8 (19 May 2023 05:14), Max: 37.1 (18 May 2023 14:40)  T(F): 98.2 (19 May 2023 05:14), Max: 98.7 (18 May 2023 14:40)  HR: 83 (19 May 2023 05:14) (83 - 91)  BP: 123/75 (19 May 2023 05:14) (116/50 - 147/86)  BP(mean): --  RR: 18 (19 May 2023 05:14) (16 - 18)  SpO2: 98% (19 May 2023 05:14) (98% - 99%)    Parameters below as of 19 May 2023 05:14  Patient On (Oxygen Delivery Method): room air      I&O's Detail    18 May 2023 07:01  -  19 May 2023 07:00  --------------------------------------------------------  IN:    dextrose 5% + sodium chloride 0.45%: 1540 mL  Total IN: 1540 mL    OUT:  Total OUT: 0 mL    Total NET: 1540 mL        aluminum hydroxide/magnesium hydroxide/simethicone Suspension 30 milliLiter(s) Oral every 4 hours PRN  famotidine    Tablet 40 milliGRAM(s) Oral daily  levoFLOXacin IVPB      levoFLOXacin IVPB 750 milliGRAM(s) IV Intermittent every 24 hours  metroNIDAZOLE  IVPB      metroNIDAZOLE  IVPB 500 milliGRAM(s) IV Intermittent every 8 hours      Physical Exam  General: AAOx3, No acute distress  Skin: No jaundice, no icterus  Abdomen: soft,  nondistended, RUQ TTP, no rebound tenderness, no guarding, no palpable masses  Extremities: non edematous, no calf pain bilaterally      Labs:                        14.3   4.91  )-----------( 237      ( 19 May 2023 06:35 )             40.9     05-19    136  |  107  |  8   ----------------------------<  192<H>  4.1   |  23  |  1.07    Ca    8.5      19 May 2023 06:35    TPro  7.0  /  Alb  2.8<L>  /  TBili  0.6  /  DBili  x   /  AST  38  /  ALT  33  /  AlkPhos  49  05-19

## 2023-05-19 NOTE — BRIEF OPERATIVE NOTE - NSICDXBRIEFPROCEDURE_GEN_ALL_CORE_FT
PROCEDURES:  Robot-assisted cholecystectomy with cholangiography using da Deandre Xi 19-May-2023 18:01:23  Michele Henao

## 2023-05-19 NOTE — PROGRESS NOTE ADULT - ATTENDING COMMENTS
#Cholelithiasis          #Uncontrolled type 2 DM   #Diabetic neuropathy  #PAD in setting of uncontrolled DM   #Hx ruptured brain aneurysm s/p surgical decompression  #Gastritis  #Migraine  #HTN    52yM with PMH of uncontrolled type 2 DM, gastritis, PAD, and ruptured brain aneurysm s/p surgical decompression 10 years ago, who presented with abdominal pain, found with gallstones.   Patient seen at bedside with wife present. Patient anticipating surgery today. Reports mild abdominal pain.     -Medical clearance in consult note   -Cardiac clearance obtained, echo with G1DD and EF >55%, trace MR   -Migraine resolved  -HgbA1c 10.8%; consider endocrine and nutrition consult   -Recommend holding patient's home insulin regimen: Novolog 25u QAC and Basaglar 35u QHS  -Continue Lantus 15u QHS, start Lispro 3u before meals once diet is resumed  -Tight glycemic control, BG should range 140-180; persistently over 200    -Recommend nutrition consult, per patient's wife, he does not follow a healthy diabetic diet  -Continue home antihypertensive, lisinopril 2.5mg  -Continue duloxetine for diabetic neuropathy   -Medicine will continue to follow

## 2023-05-19 NOTE — PROGRESS NOTE ADULT - SUBJECTIVE AND OBJECTIVE BOX
INTERVAL HPI/OVERNIGHT EVENTS:   Patient examined bedside, no new c/o , still c/o RUQ abdominal pain     REVIEW OF SYSTEMS:  CONSTITUTIONAL: No fever, weight loss, or fatigue  RESPIRATORY: No cough, wheezing, chills or hemoptysis; No shortness of breath  CARDIOVASCULAR: No chest pain, palpitations, dizziness, or leg swelling  GASTROINTESTINAL: + abdominal pain. No nausea, vomiting, or hematemesis; No diarrhea or constipation. No melena or hematochezia.  GENITOURINARY: No dysuria or hematuria, urinary frequency  NEUROLOGICAL: No headaches, memory loss, loss of strength, numbness, or tremors  SKIN: No itching, burning, rashes, or lesions     MEDICATIONS  (STANDING):  aspirin enteric coated 81 milliGRAM(s) Oral daily  chlorhexidine 2% Cloths 1 Application(s) Topical daily  dextrose 5% + sodium chloride 0.45%. 1000 milliLiter(s) (140 mL/Hr) IV Continuous <Continuous>  dextrose 5%. 1000 milliLiter(s) (50 mL/Hr) IV Continuous <Continuous>  dextrose 50% Injectable 25 Gram(s) IV Push once  dextrose 50% Injectable 12.5 Gram(s) IV Push once  DULoxetine 60 milliGRAM(s) Oral daily  enoxaparin Injectable 40 milliGRAM(s) SubCutaneous every 24 hours  famotidine    Tablet 40 milliGRAM(s) Oral daily  glucagon  Injectable 1 milliGRAM(s) IntraMuscular once  insulin glargine Injectable (LANTUS) 15 Unit(s) SubCutaneous at bedtime  insulin lispro (ADMELOG) corrective regimen sliding scale   SubCutaneous every 6 hours  levoFLOXacin IVPB      levoFLOXacin IVPB 750 milliGRAM(s) IV Intermittent every 24 hours  lisinopril 2.5 milliGRAM(s) Oral daily  metroNIDAZOLE  IVPB      metroNIDAZOLE  IVPB 500 milliGRAM(s) IV Intermittent every 8 hours    MEDICATIONS  (PRN):  acetaminophen 300 mG/butalbital 50 mG/ caffeine 40 mG 1 Capsule(s) Oral every 4 hours PRN headache  aluminum hydroxide/magnesium hydroxide/simethicone Suspension 30 milliLiter(s) Oral every 4 hours PRN Dyspepsia  dextrose Oral Gel 15 Gram(s) Oral once PRN Blood Glucose LESS THAN 70 milliGRAM(s)/deciliter  HYDROmorphone  Injectable 0.5 milliGRAM(s) IV Push every 4 hours PRN Moderate Pain (4 - 6)  ketorolac   Injectable 15 milliGRAM(s) IV Push every 6 hours PRN Moderate Pain (4 - 6)  ondansetron Injectable 4 milliGRAM(s) IV Push every 6 hours PRN Nausea      Vital Signs Last 24 Hrs  T(C): 36.8 (19 May 2023 05:14), Max: 37.1 (18 May 2023 14:40)  T(F): 98.2 (19 May 2023 05:14), Max: 98.7 (18 May 2023 14:40)  HR: 83 (19 May 2023 05:14) (83 - 91)  BP: 123/75 (19 May 2023 05:14) (116/50 - 147/86)  BP(mean): --  RR: 18 (19 May 2023 05:14) (16 - 18)  SpO2: 98% (19 May 2023 05:14) (98% - 99%)    Parameters below as of 19 May 2023 05:14  Patient On (Oxygen Delivery Method): room air        PHYSICAL EXAMINATION:  GENERAL: NAD, well built  HEAD:  Atraumatic, Normocephalic  EYES:  conjunctiva and sclera clear  NECK: Supple, No JVD, Normal thyroid  CHEST/LUNG: Clear to auscultation. Clear to percussion bilaterally; No rales, rhonchi, wheezing, or rubs  HEART: Regular rate and rhythm; No murmurs, rubs, or gallops  ABDOMEN: Soft, RUQ tenderness, Nondistended; Bowel sounds present  NERVOUS SYSTEM:  Alert & Oriented X3,    EXTREMITIES:  2+ Peripheral Pulses, No clubbing, cyanosis, or edema  SKIN: warm dry                          14.3   4.91  )-----------( 237      ( 19 May 2023 06:35 )             40.9     05-19    136  |  107  |  8   ----------------------------<  192<H>  4.1   |  23  |  1.07    Ca    8.5      19 May 2023 06:35    TPro  7.0  /  Alb  2.8<L>  /  TBili  0.6  /  DBili  x   /  AST  38  /  ALT  33  /  AlkPhos  49  05-19    LIVER FUNCTIONS - ( 19 May 2023 06:35 )  Alb: 2.8 g/dL / Pro: 7.0 g/dL / ALK PHOS: 49 U/L / ALT: 33 U/L DA / AST: 38 U/L / GGT: x                       CAPILLARY BLOOD GLUCOSE  CAPILLARY BLOOD GLUCOSE      POCT Blood Glucose.: 194 mg/dL (19 May 2023 05:21)    CAPILLARY BLOOD GLUCOSE      POCT Blood Glucose.: 194 mg/dL (19 May 2023 05:21)  POCT Blood Glucose.: 218 mg/dL (18 May 2023 23:34)  POCT Blood Glucose.: 182 mg/dL (18 May 2023 22:11)  POCT Blood Glucose.: 225 mg/dL (18 May 2023 16:17)  POCT Blood Glucose.: 214 mg/dL (18 May 2023 11:32)

## 2023-05-20 ENCOUNTER — TRANSCRIPTION ENCOUNTER (OUTPATIENT)
Age: 53
End: 2023-05-20

## 2023-05-20 VITALS
SYSTOLIC BLOOD PRESSURE: 115 MMHG | TEMPERATURE: 97 F | DIASTOLIC BLOOD PRESSURE: 68 MMHG | RESPIRATION RATE: 18 BRPM | HEART RATE: 101 BPM | OXYGEN SATURATION: 98 %

## 2023-05-20 LAB
ALBUMIN SERPL ELPH-MCNC: 3.1 G/DL — LOW (ref 3.5–5)
ALP SERPL-CCNC: 54 U/L — SIGNIFICANT CHANGE UP (ref 40–120)
ALT FLD-CCNC: 34 U/L DA — SIGNIFICANT CHANGE UP (ref 10–60)
ANION GAP SERPL CALC-SCNC: 5 MMOL/L — SIGNIFICANT CHANGE UP (ref 5–17)
AST SERPL-CCNC: 21 U/L — SIGNIFICANT CHANGE UP (ref 10–40)
BILIRUB SERPL-MCNC: 0.5 MG/DL — SIGNIFICANT CHANGE UP (ref 0.2–1.2)
BUN SERPL-MCNC: 10 MG/DL — SIGNIFICANT CHANGE UP (ref 7–18)
CALCIUM SERPL-MCNC: 9 MG/DL — SIGNIFICANT CHANGE UP (ref 8.4–10.5)
CHLORIDE SERPL-SCNC: 107 MMOL/L — SIGNIFICANT CHANGE UP (ref 96–108)
CO2 SERPL-SCNC: 26 MMOL/L — SIGNIFICANT CHANGE UP (ref 22–31)
CREAT SERPL-MCNC: 1.09 MG/DL — SIGNIFICANT CHANGE UP (ref 0.5–1.3)
EGFR: 82 ML/MIN/1.73M2 — SIGNIFICANT CHANGE UP
GLUCOSE BLDC GLUCOMTR-MCNC: 244 MG/DL — HIGH (ref 70–99)
GLUCOSE BLDC GLUCOMTR-MCNC: 273 MG/DL — HIGH (ref 70–99)
GLUCOSE BLDC GLUCOMTR-MCNC: 304 MG/DL — HIGH (ref 70–99)
GLUCOSE SERPL-MCNC: 257 MG/DL — HIGH (ref 70–99)
HCT VFR BLD CALC: 40.6 % — SIGNIFICANT CHANGE UP (ref 39–50)
HGB BLD-MCNC: 14.2 G/DL — SIGNIFICANT CHANGE UP (ref 13–17)
MAGNESIUM SERPL-MCNC: 2.2 MG/DL — SIGNIFICANT CHANGE UP (ref 1.6–2.6)
MCHC RBC-ENTMCNC: 30.3 PG — SIGNIFICANT CHANGE UP (ref 27–34)
MCHC RBC-ENTMCNC: 35 GM/DL — SIGNIFICANT CHANGE UP (ref 32–36)
MCV RBC AUTO: 86.8 FL — SIGNIFICANT CHANGE UP (ref 80–100)
NRBC # BLD: 0 /100 WBCS — SIGNIFICANT CHANGE UP (ref 0–0)
PHOSPHATE SERPL-MCNC: 3.3 MG/DL — SIGNIFICANT CHANGE UP (ref 2.5–4.5)
PLATELET # BLD AUTO: 263 K/UL — SIGNIFICANT CHANGE UP (ref 150–400)
POTASSIUM SERPL-MCNC: 4 MMOL/L — SIGNIFICANT CHANGE UP (ref 3.5–5.3)
POTASSIUM SERPL-SCNC: 4 MMOL/L — SIGNIFICANT CHANGE UP (ref 3.5–5.3)
PROT SERPL-MCNC: 7 G/DL — SIGNIFICANT CHANGE UP (ref 6–8.3)
RBC # BLD: 4.68 M/UL — SIGNIFICANT CHANGE UP (ref 4.2–5.8)
RBC # FLD: 11.8 % — SIGNIFICANT CHANGE UP (ref 10.3–14.5)
SODIUM SERPL-SCNC: 138 MMOL/L — SIGNIFICANT CHANGE UP (ref 135–145)
WBC # BLD: 9.47 K/UL — SIGNIFICANT CHANGE UP (ref 3.8–10.5)
WBC # FLD AUTO: 9.47 K/UL — SIGNIFICANT CHANGE UP (ref 3.8–10.5)

## 2023-05-20 PROCEDURE — 84484 ASSAY OF TROPONIN QUANT: CPT

## 2023-05-20 PROCEDURE — 96374 THER/PROPH/DIAG INJ IV PUSH: CPT

## 2023-05-20 PROCEDURE — 85730 THROMBOPLASTIN TIME PARTIAL: CPT

## 2023-05-20 PROCEDURE — S2900: CPT

## 2023-05-20 PROCEDURE — 86850 RBC ANTIBODY SCREEN: CPT

## 2023-05-20 PROCEDURE — 99232 SBSQ HOSP IP/OBS MODERATE 35: CPT

## 2023-05-20 PROCEDURE — 93005 ELECTROCARDIOGRAM TRACING: CPT

## 2023-05-20 PROCEDURE — 88304 TISSUE EXAM BY PATHOLOGIST: CPT

## 2023-05-20 PROCEDURE — 74177 CT ABD & PELVIS W/CONTRAST: CPT | Mod: MA

## 2023-05-20 PROCEDURE — 85025 COMPLETE CBC W/AUTO DIFF WBC: CPT

## 2023-05-20 PROCEDURE — 84100 ASSAY OF PHOSPHORUS: CPT

## 2023-05-20 PROCEDURE — 86900 BLOOD TYPING SEROLOGIC ABO: CPT

## 2023-05-20 PROCEDURE — 83690 ASSAY OF LIPASE: CPT

## 2023-05-20 PROCEDURE — 99285 EMERGENCY DEPT VISIT HI MDM: CPT | Mod: 25

## 2023-05-20 PROCEDURE — 83036 HEMOGLOBIN GLYCOSYLATED A1C: CPT

## 2023-05-20 PROCEDURE — 83735 ASSAY OF MAGNESIUM: CPT

## 2023-05-20 PROCEDURE — 36415 COLL VENOUS BLD VENIPUNCTURE: CPT

## 2023-05-20 PROCEDURE — 82962 GLUCOSE BLOOD TEST: CPT

## 2023-05-20 PROCEDURE — 96375 TX/PRO/DX INJ NEW DRUG ADDON: CPT

## 2023-05-20 PROCEDURE — 93306 TTE W/DOPPLER COMPLETE: CPT

## 2023-05-20 PROCEDURE — C1889: CPT

## 2023-05-20 PROCEDURE — 85610 PROTHROMBIN TIME: CPT

## 2023-05-20 PROCEDURE — 85027 COMPLETE CBC AUTOMATED: CPT

## 2023-05-20 PROCEDURE — 86901 BLOOD TYPING SEROLOGIC RH(D): CPT

## 2023-05-20 PROCEDURE — 80053 COMPREHEN METABOLIC PANEL: CPT

## 2023-05-20 PROCEDURE — 76705 ECHO EXAM OF ABDOMEN: CPT

## 2023-05-20 PROCEDURE — 80048 BASIC METABOLIC PNL TOTAL CA: CPT

## 2023-05-20 PROCEDURE — 82947 ASSAY GLUCOSE BLOOD QUANT: CPT

## 2023-05-20 PROCEDURE — 96376 TX/PRO/DX INJ SAME DRUG ADON: CPT

## 2023-05-20 RX ORDER — OXYCODONE HYDROCHLORIDE 5 MG/1
1 TABLET ORAL
Qty: 5 | Refills: 0
Start: 2023-05-20

## 2023-05-20 RX ADMIN — FAMOTIDINE 40 MILLIGRAM(S): 10 INJECTION INTRAVENOUS at 14:04

## 2023-05-20 RX ADMIN — Medication 81 MILLIGRAM(S): at 14:04

## 2023-05-20 RX ADMIN — Medication 8: at 14:05

## 2023-05-20 RX ADMIN — Medication 4: at 05:28

## 2023-05-20 RX ADMIN — LISINOPRIL 2.5 MILLIGRAM(S): 2.5 TABLET ORAL at 05:27

## 2023-05-20 RX ADMIN — CHLORHEXIDINE GLUCONATE 1 APPLICATION(S): 213 SOLUTION TOPICAL at 12:45

## 2023-05-20 RX ADMIN — DULOXETINE HYDROCHLORIDE 60 MILLIGRAM(S): 30 CAPSULE, DELAYED RELEASE ORAL at 14:03

## 2023-05-20 NOTE — DISCHARGE NOTE NURSING/CASE MANAGEMENT/SOCIAL WORK - PATIENT PORTAL LINK FT
You can access the FollowMyHealth Patient Portal offered by [facility] by registering at the following website: [email or web address]. By joining [facility]’s FollowMyHealth portal, you will also be able to view your health information using other applications (apps) compatible with our system.

## 2023-05-20 NOTE — PROGRESS NOTE ADULT - ASSESSMENT
#Cholelithiasis          #Uncontrolled type 2 DM   #Diabetic neuropathy  #PAD in setting of uncontrolled DM   #Hx ruptured brain aneurysm s/p surgical decompression  #Gastritis  #Migraine  #HTN    52yM with PMH of uncontrolled type 2 DM, gastritis, PAD, and ruptured brain aneurysm s/p surgical decompression 10 years ago, who presented with abdominal pain, found with gallstones.   Patient seen at bedside with wife present. Patient anticipating surgery today. Reports mild abdominal pain.     -Patient s/p lap calvin on 05/19  -Migraine resolved  -HgbA1c 10.8%; consider endocrine and nutrition consult   -Can resume home doses of long and short-acting insulin  -Recommend endocrine consult for uncontrolled diabetes  -Tight glycemic control, BG should range 140-180; persistently elevated  -Recommend nutrition consult  -Continue home antihypertensive, lisinopril 2.5mg  -Continue duloxetine for diabetic neuropathy   -Medicine will continue to follow

## 2023-05-20 NOTE — DISCHARGE NOTE NURSING/CASE MANAGEMENT/SOCIAL WORK - NSDCPEFALRISK_GEN_ALL_CORE
For information on Fall & Injury Prevention, visit: [email or web address] OR  [email or web address] OR  https://www.cdc.gov/steadi/patient.html

## 2023-05-20 NOTE — DISCHARGE NOTE PROVIDER - NSDCCPCAREPLAN_GEN_ALL_CORE_FT
PRINCIPAL DISCHARGE DIAGNOSIS  Diagnosis: Gallstone  Assessment and Plan of Treatment: May shower. Remove dressing in 48 hrs. Leave steristrips intact. Resume regular diet. No heavy lifting, straining, exercises for 2 weeks.

## 2023-05-20 NOTE — PROGRESS NOTE ADULT - NSPROGADDITIONALINFOA_GEN_ALL_CORE
pt seen  stable for d/c
Pt getting cardiology work up  Robotic lap calvin discussed
Patient admitted with Cholelithiasis and acute cholecystitis. Radiology studies reviewed. Tender RUQ abdomen. Robotic assisted  Laparoscopic cholecystectomy possible open was discussed. The potential for bleeding, CBD injury, bowel injury and other related complications were discussed. All the questions were answered.  Informed consent for robotic assisted  laparoscopic cholecystectomy possible open surgery was obtained

## 2023-05-20 NOTE — DISCHARGE NOTE PROVIDER - CARE PROVIDER_API CALL
Jaswinder Naik (MD)  Surgery  [address] [place], [place] [id number]  Phone: [phone]  Fax: [phone]  Follow Up Time:

## 2023-05-20 NOTE — PROGRESS NOTE ADULT - SUBJECTIVE AND OBJECTIVE BOX
S:    REVIEW OF SYSTEMS:  CONSTITUTIONAL: No weakness, fevers or chills  EYES: No visual changes  ENT: No vertigo or throat pain   NECK: No pain or stiffness  RESPIRATORY: No cough, wheezing, hemoptysis; No shortness of breath  CARDIOVASCULAR: No chest pain or palpitations  GASTROINTESTINAL: No abdominal or epigastric pain. No nausea, vomiting, or hematemesis; No diarrhea or constipation. No melena or hematochezia.  GENITOURINARY: No dysuria, frequency or hematuria  NEUROLOGICAL: No numbness or weakness  SKIN: No itching, rashes  PSYCH: No depression, anxiety    O:  Vital Signs Last 24 Hrs  T(C): 37.1 (20 May 2023 05:53), Max: 37.5 (20 May 2023 00:30)  T(F): 98.7 (20 May 2023 05:53), Max: 99.5 (20 May 2023 00:30)  HR: 95 (20 May 2023 05:53) (82 - 99)  BP: 126/80 (20 May 2023 05:53) (122/75 - 148/88)  BP(mean): 97 (19 May 2023 19:50) (88 - 100)  RR: 18 (20 May 2023 05:53) (12 - 18)  SpO2: 97% (20 May 2023 05:53) (96% - 100%)    Parameters below as of 20 May 2023 05:53  Patient On (Oxygen Delivery Method): room air        GENERAL: NAD, well-developed  HEAD:  Atraumatic, Normocephalic  EYES: EOMI, anicteric sclera bilaterally  NECK: Supple, No JVD  CHEST/LUNG: Clear to auscultation bilaterally, symmetrical chest rise  HEART: Regular rate and rhythm; No murmurs, rubs, or gallops  ABDOMEN: Soft, Nontender, Nondistended; Bowel sounds present  EXTREMITIES:  2+ Peripheral Pulses, No clubbing, cyanosis, or edema  PSYCH: AAOx3, normal affect  NEUROLOGY: cranial nerves grossly intact, moves all extremities  SKIN: No rashes or lesions, dry, warm    acetaminophen 300 mG/butalbital 50 mG/ caffeine 40 mG 1 Capsule(s) Oral every 4 hours PRN  aluminum hydroxide/magnesium hydroxide/simethicone Suspension 30 milliLiter(s) Oral every 4 hours PRN  aspirin enteric coated 81 milliGRAM(s) Oral daily  chlorhexidine 2% Cloths 1 Application(s) Topical daily  dextrose 5% + sodium chloride 0.45%. 1000 milliLiter(s) IV Continuous <Continuous>  dextrose 5%. 1000 milliLiter(s) IV Continuous <Continuous>  dextrose 50% Injectable 25 Gram(s) IV Push once  dextrose 50% Injectable 12.5 Gram(s) IV Push once  dextrose Oral Gel 15 Gram(s) Oral once PRN  DULoxetine 60 milliGRAM(s) Oral daily  famotidine    Tablet 40 milliGRAM(s) Oral daily  glucagon  Injectable 1 milliGRAM(s) IntraMuscular once  HYDROmorphone  Injectable 0.5 milliGRAM(s) IV Push every 4 hours PRN  indocyanine green Injectable 3 milliGRAM(s) IV Push once  insulin glargine Injectable (LANTUS) 15 Unit(s) SubCutaneous at bedtime  insulin lispro (ADMELOG) corrective regimen sliding scale   SubCutaneous every 6 hours  ketorolac   Injectable 15 milliGRAM(s) IV Push every 6 hours PRN  lisinopril 2.5 milliGRAM(s) Oral daily  ondansetron Injectable 4 milliGRAM(s) IV Push every 6 hours PRN                            14.2   9.47  )-----------( 263      ( 20 May 2023 04:45 )             40.6       05-20    138  |  107  |  10  ----------------------------<  257<H>  4.0   |  26  |  1.09    Ca    9.0      20 May 2023 04:45  Phos  3.3     05-20  Mg     2.2     05-20    TPro  7.0  /  Alb  3.1<L>  /  TBili  0.5  /  DBili  x   /  AST  21  /  ALT  34  /  AlkPhos  54  05-20       S: Patient seen and examined at bedside. No acute events overnight. Reports mild pain at incision sites. Tolerating diet, denies any n/v/d.     REVIEW OF SYSTEMS:  CONSTITUTIONAL: No weakness, fevers or chills  EYES: No visual changes  ENT: No vertigo or throat pain   NECK: No pain or stiffness  RESPIRATORY: No cough, wheezing, hemoptysis; No shortness of breath  CARDIOVASCULAR: No chest pain or palpitations  GASTROINTESTINAL: No abdominal or epigastric pain. No nausea, vomiting, or hematemesis; No diarrhea or constipation. No melena or hematochezia.  GENITOURINARY: No dysuria, frequency or hematuria  NEUROLOGICAL: No numbness or weakness  SKIN: No itching, rashes  PSYCH: No depression, anxiety    O:  Vital Signs Last 24 Hrs  T(C): 37.1 (20 May 2023 05:53), Max: 37.5 (20 May 2023 00:30)  T(F): 98.7 (20 May 2023 05:53), Max: 99.5 (20 May 2023 00:30)  HR: 95 (20 May 2023 05:53) (82 - 99)  BP: 126/80 (20 May 2023 05:53) (122/75 - 148/88)  BP(mean): 97 (19 May 2023 19:50) (88 - 100)  RR: 18 (20 May 2023 05:53) (12 - 18)  SpO2: 97% (20 May 2023 05:53) (96% - 100%)    Parameters below as of 20 May 2023 05:53  Patient On (Oxygen Delivery Method): room air    Constitutional/General: Well developed, vitals reviewed  EYE: Symmetrical pupils, conjunctiva clear   ENT: Good dentition, oropharynx clear  NECK: No visual masses, no JVD  CHEST: No respiratory distress, bilateral symmetrical chest rise  ABDOMEN: Nondistended, no visual masses, incision sites cdi   SKIN: No rash, warm, dry  NEURO: A+Ox3, Cranial nerves grossly intact, moves all extremities, follows commands  PSYCH: Normal mood, normal affect    acetaminophen 300 mG/butalbital 50 mG/ caffeine 40 mG 1 Capsule(s) Oral every 4 hours PRN  aluminum hydroxide/magnesium hydroxide/simethicone Suspension 30 milliLiter(s) Oral every 4 hours PRN  aspirin enteric coated 81 milliGRAM(s) Oral daily  chlorhexidine 2% Cloths 1 Application(s) Topical daily  dextrose 5% + sodium chloride 0.45%. 1000 milliLiter(s) IV Continuous <Continuous>  dextrose 5%. 1000 milliLiter(s) IV Continuous <Continuous>  dextrose 50% Injectable 25 Gram(s) IV Push once  dextrose 50% Injectable 12.5 Gram(s) IV Push once  dextrose Oral Gel 15 Gram(s) Oral once PRN  DULoxetine 60 milliGRAM(s) Oral daily  famotidine    Tablet 40 milliGRAM(s) Oral daily  glucagon  Injectable 1 milliGRAM(s) IntraMuscular once  HYDROmorphone  Injectable 0.5 milliGRAM(s) IV Push every 4 hours PRN  indocyanine green Injectable 3 milliGRAM(s) IV Push once  insulin glargine Injectable (LANTUS) 15 Unit(s) SubCutaneous at bedtime  insulin lispro (ADMELOG) corrective regimen sliding scale   SubCutaneous every 6 hours  ketorolac   Injectable 15 milliGRAM(s) IV Push every 6 hours PRN  lisinopril 2.5 milliGRAM(s) Oral daily  ondansetron Injectable 4 milliGRAM(s) IV Push every 6 hours PRN                            14.2   9.47  )-----------( 263      ( 20 May 2023 04:45 )             40.6       05-20    138  |  107  |  10  ----------------------------<  257<H>  4.0   |  26  |  1.09    Ca    9.0      20 May 2023 04:45  Phos  3.3     05-20  Mg     2.2     05-20    TPro  7.0  /  Alb  3.1<L>  /  TBili  0.5  /  DBili  x   /  AST  21  /  ALT  34  /  AlkPhos  54  05-20

## 2023-05-20 NOTE — DISCHARGE NOTE PROVIDER - NSDCMRMEDTOKEN_GEN_ALL_CORE_FT
aspirin 81 mg oral tablet: 1 cap(s) orally once a day  Basaglar KwikPen 100 units/mL subcutaneous solution: 35 unit(s) subcutaneous once a day (at bedtime)  DULoxetine 60 mg oral delayed release capsule: 1 cap(s) orally once a day  famotidine 40 mg oral tablet: 1 tab(s) orally once a day  lisinopril 2.5 mg oral tablet: 1 tab(s) orally once a day  NovoLOG 100 units/mL injectable solution: 25 unit(s) injectable 3 times a day

## 2023-05-20 NOTE — PROGRESS NOTE ADULT - SUBJECTIVE AND OBJECTIVE BOX
POST-OPERATIVE NOTE    Subjective:   52y Male s/p robot- assisted cholecystectomy with IOC POD #0  . Seen and examined at bed side . Denies nausea, vomiting, chest pain, sob, fevers chills. Pain is well controlled. . Voiding .    Vital Signs Last 24 Hrs  T(C): 37.1 (20 May 2023 05:53), Max: 37.5 (20 May 2023 00:30)  T(F): 98.7 (20 May 2023 05:53), Max: 99.5 (20 May 2023 00:30)  HR: 95 (20 May 2023 05:53) (82 - 99)  BP: 126/80 (20 May 2023 05:53) (122/75 - 148/88)  BP(mean): 97 (19 May 2023 19:50) (88 - 100)  RR: 18 (20 May 2023 05:53) (12 - 18)  SpO2: 97% (20 May 2023 05:53) (96% - 100%)    Parameters below as of 20 May 2023 05:53  Patient On (Oxygen Delivery Method): room air      I&O's Detail    18 May 2023 07:01  -  19 May 2023 07:00  --------------------------------------------------------  IN:    dextrose 5% + sodium chloride 0.45%: 1540 mL  Total IN: 1540 mL    OUT:  Total OUT: 0 mL    Total NET: 1540 mL      19 May 2023 07:01  -  20 May 2023 06:40  --------------------------------------------------------  IN:    Lactated Ringers: 150 mL  Total IN: 150 mL    OUT:    Voided (mL): 350 mL  Total OUT: 350 mL    Total NET: -200 mL          Physical Exam:  General: NAD, resting comfortably in bed  Pulmonary: Nonlabored breathing, no respiratory distress  Cardiovascular: NSR, S1, S2  Abdominal: soft, NT/ND, dressing c/d/i  Extremities: no edema, no calf tenderness, distal pulses are palpable     LABS:                        14.2   9.47  )-----------( 263      ( 20 May 2023 04:45 )             40.6     05-20    138  |  107  |  10  ----------------------------<  257<H>  4.0   |  26  |  1.09    Ca    9.0      20 May 2023 04:45  Phos  3.3     05-20  Mg     2.2     05-20    TPro  7.0  /  Alb  3.1<L>  /  TBili  0.5  /  DBili  x   /  AST  21  /  ALT  34  /  AlkPhos  54  05-20    LIVER FUNCTIONS - ( 20 May 2023 04:45 )  Alb: 3.1 g/dL / Pro: 7.0 g/dL / ALK PHOS: 54 U/L / ALT: 34 U/L DA / AST: 21 U/L / GGT: x             MEDICATIONS  (STANDING):  aspirin enteric coated 81 milliGRAM(s) Oral daily  chlorhexidine 2% Cloths 1 Application(s) Topical daily  dextrose 5% + sodium chloride 0.45%. 1000 milliLiter(s) (140 mL/Hr) IV Continuous <Continuous>  dextrose 5%. 1000 milliLiter(s) (50 mL/Hr) IV Continuous <Continuous>  dextrose 50% Injectable 25 Gram(s) IV Push once  dextrose 50% Injectable 12.5 Gram(s) IV Push once  DULoxetine 60 milliGRAM(s) Oral daily  famotidine    Tablet 40 milliGRAM(s) Oral daily  glucagon  Injectable 1 milliGRAM(s) IntraMuscular once  indocyanine green Injectable 3 milliGRAM(s) IV Push once  insulin glargine Injectable (LANTUS) 15 Unit(s) SubCutaneous at bedtime  insulin lispro (ADMELOG) corrective regimen sliding scale   SubCutaneous every 6 hours  lisinopril 2.5 milliGRAM(s) Oral daily    MEDICATIONS  (PRN):  acetaminophen 300 mG/butalbital 50 mG/ caffeine 40 mG 1 Capsule(s) Oral every 4 hours PRN headache  aluminum hydroxide/magnesium hydroxide/simethicone Suspension 30 milliLiter(s) Oral every 4 hours PRN Dyspepsia  dextrose Oral Gel 15 Gram(s) Oral once PRN Blood Glucose LESS THAN 70 milliGRAM(s)/deciliter  HYDROmorphone  Injectable 0.5 milliGRAM(s) IV Push every 4 hours PRN Moderate Pain (4 - 6)  ketorolac   Injectable 15 milliGRAM(s) IV Push every 6 hours PRN Moderate Pain (4 - 6)  ondansetron Injectable 4 milliGRAM(s) IV Push every 6 hours PRN Nausea      Assessment:   52y Male who is s/p robot- assisted cholecystectomy with IOC POD #0. Stable    Plan:  - Pain control prn  -Dressing change prn   - Reg diet and DC IV fluids  - Incentive Spirometry  - OOB and ambulating as tolerated  - F/u AM labs  - DVT ppx

## 2023-05-20 NOTE — DISCHARGE NOTE PROVIDER - NSDCCPTREATMENT_GEN_ALL_CORE_FT
PRINCIPAL PROCEDURE  Procedure: Robot-assisted cholecystectomy with cholangiography using da Deandre Xi  Findings and Treatment:

## 2023-05-20 NOTE — DISCHARGE NOTE PROVIDER - HOSPITAL COURSE
51 y/o male with PMH of HTN, DM, Gastritis, Depression disorder presents with Abd Pain  x 1 wk . Pain is in the RUQ , very sharp, persistent  and disabling.  Not worse with food, no clear exacerbating factors. No nausea, vomiting, fevers, chills. No hx of abdominal surgeries. No other symptoms. US and CT showed gallstones. After cardio clearance, pt underwent robotic assisted lap cholecystectomy AD#4. Pt stable post operatively, discharged POD#1

## 2023-05-22 PROBLEM — E11.9 TYPE 2 DIABETES MELLITUS WITHOUT COMPLICATIONS: Chronic | Status: ACTIVE | Noted: 2023-05-16

## 2023-05-22 PROBLEM — F32.9 MAJOR DEPRESSIVE DISORDER, SINGLE EPISODE, UNSPECIFIED: Chronic | Status: ACTIVE | Noted: 2023-05-16

## 2023-05-22 PROBLEM — K29.70 GASTRITIS, UNSPECIFIED, WITHOUT BLEEDING: Chronic | Status: ACTIVE | Noted: 2023-05-16

## 2023-05-22 PROBLEM — I10 ESSENTIAL (PRIMARY) HYPERTENSION: Chronic | Status: ACTIVE | Noted: 2023-05-16

## 2023-05-25 PROBLEM — Z00.00 ENCOUNTER FOR PREVENTIVE HEALTH EXAMINATION: Status: ACTIVE | Noted: 2023-05-25

## 2023-05-25 LAB — SURGICAL PATHOLOGY STUDY: SIGNIFICANT CHANGE UP

## 2023-06-05 ENCOUNTER — APPOINTMENT (OUTPATIENT)
Dept: SURGERY | Facility: CLINIC | Age: 53
End: 2023-06-05
Payer: MEDICAID

## 2023-06-05 DIAGNOSIS — E78.00 PURE HYPERCHOLESTEROLEMIA, UNSPECIFIED: ICD-10-CM

## 2023-06-05 DIAGNOSIS — I21.9 ACUTE MYOCARDIAL INFARCTION, UNSPECIFIED: ICD-10-CM

## 2023-06-05 DIAGNOSIS — I10 ESSENTIAL (PRIMARY) HYPERTENSION: ICD-10-CM

## 2023-06-05 DIAGNOSIS — I63.9 CEREBRAL INFARCTION, UNSPECIFIED: ICD-10-CM

## 2023-06-05 DIAGNOSIS — Z90.49 ACQUIRED ABSENCE OF OTHER SPECIFIED PARTS OF DIGESTIVE TRACT: ICD-10-CM

## 2023-06-05 DIAGNOSIS — K81.0 ACUTE CHOLECYSTITIS: ICD-10-CM

## 2023-06-05 DIAGNOSIS — E11.9 TYPE 2 DIABETES MELLITUS W/OUT COMPLICATIONS: ICD-10-CM

## 2023-06-05 DIAGNOSIS — Z78.9 OTHER SPECIFIED HEALTH STATUS: ICD-10-CM

## 2023-06-05 PROCEDURE — 99024 POSTOP FOLLOW-UP VISIT: CPT

## 2023-06-05 NOTE — REASON FOR VISIT
[Post Op: _________] : a [unfilled] post op visit [Spouse] : spouse [FreeTextEntry1] : S/P Robotic-assisted laparoscopic cholecystectomy, 05/19/23

## 2023-06-05 NOTE — PHYSICAL EXAM
[No Rash or Lesion] : No rash or lesion [Alert] : alert [Oriented to Person] : oriented to person [Oriented to Place] : oriented to place [Oriented to Time] : oriented to time [Calm] : calm [de-identified] : A/Ox3; NAD. appears comfortable [de-identified] : EOMI; sclera anicteric. [de-identified] : Abdomen soft and non tender. Wounds healing well. Port sites with no erythema or drainage.

## 2023-06-05 NOTE — ASSESSMENT
[FreeTextEntry1] : Mr. MARINELLI is a 52 year y/o M, S/P Robotic-assisted laparoscopic cholecystectomy, 05/19/23. \par \par Patient is doing well, with excellent post-operative recovery. All surgical incisions are healing well and as expected. There is no evidence of infection or complication, and patient is progressing as expected.\par

## 2023-06-05 NOTE — HISTORY OF PRESENT ILLNESS
[de-identified] : JULIETTE MARINELLI is a 52 y.o M, recently admitted to [facility] w acute cholecystitis, presents to the office for postoperative visit today, he is S/P Robotic-assisted laparoscopic cholecystectomy, 05/19/23. \par Patient is without reported complaints. Denies abdominal pain. No nausea/vomiting. No fevers/chills. Patient is tolerating a regular diet with normal appetite. Normal BM's.\par Has some incision site tenderness. States his pain was 7/10 after the surgery, and had taken about 2 tabs of prescribed Percocet. \par He notes feeling improvement today.
